# Patient Record
Sex: FEMALE | Race: WHITE | NOT HISPANIC OR LATINO | Employment: OTHER | URBAN - METROPOLITAN AREA
[De-identification: names, ages, dates, MRNs, and addresses within clinical notes are randomized per-mention and may not be internally consistent; named-entity substitution may affect disease eponyms.]

---

## 2017-02-07 ENCOUNTER — COMMUNICATION - HEALTHEAST (OUTPATIENT)
Dept: FAMILY MEDICINE | Facility: CLINIC | Age: 57
End: 2017-02-07

## 2017-02-07 DIAGNOSIS — I10 HYPERTENSION: ICD-10-CM

## 2017-02-09 ENCOUNTER — COMMUNICATION - HEALTHEAST (OUTPATIENT)
Dept: FAMILY MEDICINE | Facility: CLINIC | Age: 57
End: 2017-02-09

## 2017-02-09 DIAGNOSIS — I10 HYPERTENSION: ICD-10-CM

## 2017-02-13 ENCOUNTER — COMMUNICATION - HEALTHEAST (OUTPATIENT)
Dept: SCHEDULING | Facility: CLINIC | Age: 57
End: 2017-02-13

## 2017-06-20 ENCOUNTER — OFFICE VISIT - HEALTHEAST (OUTPATIENT)
Dept: FAMILY MEDICINE | Facility: CLINIC | Age: 57
End: 2017-06-20

## 2017-06-20 DIAGNOSIS — J01.90 ACUTE SINUSITIS: ICD-10-CM

## 2017-07-03 ENCOUNTER — AMBULATORY - HEALTHEAST (OUTPATIENT)
Dept: FAMILY MEDICINE | Facility: CLINIC | Age: 57
End: 2017-07-03

## 2017-07-03 ENCOUNTER — COMMUNICATION - HEALTHEAST (OUTPATIENT)
Dept: FAMILY MEDICINE | Facility: CLINIC | Age: 57
End: 2017-07-03

## 2017-07-03 DIAGNOSIS — K64.9 HEMORRHOIDS: ICD-10-CM

## 2017-07-05 ENCOUNTER — RECORDS - HEALTHEAST (OUTPATIENT)
Dept: ADMINISTRATIVE | Facility: OTHER | Age: 57
End: 2017-07-05

## 2017-07-23 ENCOUNTER — OFFICE VISIT - HEALTHEAST (OUTPATIENT)
Dept: FAMILY MEDICINE | Facility: CLINIC | Age: 57
End: 2017-07-23

## 2017-07-23 DIAGNOSIS — J18.9 CAP (COMMUNITY ACQUIRED PNEUMONIA): ICD-10-CM

## 2017-07-23 DIAGNOSIS — R06.2 WHEEZING ON EXPIRATION: ICD-10-CM

## 2017-08-29 ENCOUNTER — COMMUNICATION - HEALTHEAST (OUTPATIENT)
Dept: FAMILY MEDICINE | Facility: CLINIC | Age: 57
End: 2017-08-29

## 2017-08-29 DIAGNOSIS — I10 HYPERTENSION: ICD-10-CM

## 2017-09-07 ENCOUNTER — OFFICE VISIT - HEALTHEAST (OUTPATIENT)
Dept: FAMILY MEDICINE | Facility: CLINIC | Age: 57
End: 2017-09-07

## 2017-09-07 DIAGNOSIS — Z13.9 SCREENING: ICD-10-CM

## 2017-09-07 ASSESSMENT — MIFFLIN-ST. JEOR: SCORE: 1179.63

## 2017-09-11 ENCOUNTER — COMMUNICATION - HEALTHEAST (OUTPATIENT)
Dept: FAMILY MEDICINE | Facility: CLINIC | Age: 57
End: 2017-09-11

## 2017-09-20 ENCOUNTER — COMMUNICATION - HEALTHEAST (OUTPATIENT)
Dept: FAMILY MEDICINE | Facility: CLINIC | Age: 57
End: 2017-09-20

## 2017-10-12 ENCOUNTER — HOSPITAL ENCOUNTER (OUTPATIENT)
Dept: MAMMOGRAPHY | Facility: CLINIC | Age: 57
Discharge: HOME | End: 2017-10-12
Attending: FAMILY MEDICINE

## 2017-10-12 DIAGNOSIS — Z12.31 VISIT FOR SCREENING MAMMOGRAM: ICD-10-CM

## 2017-11-22 ENCOUNTER — COMMUNICATION - HEALTHEAST (OUTPATIENT)
Dept: FAMILY MEDICINE | Facility: CLINIC | Age: 57
End: 2017-11-22

## 2017-11-22 DIAGNOSIS — I10 HYPERTENSION: ICD-10-CM

## 2018-09-04 ENCOUNTER — RECORDS - HEALTHEAST (OUTPATIENT)
Dept: ADMINISTRATIVE | Facility: OTHER | Age: 58
End: 2018-09-04

## 2018-09-18 ENCOUNTER — COMMUNICATION - HEALTHEAST (OUTPATIENT)
Dept: FAMILY MEDICINE | Facility: CLINIC | Age: 58
End: 2018-09-18

## 2018-09-18 DIAGNOSIS — I10 HYPERTENSION: ICD-10-CM

## 2018-10-08 ENCOUNTER — OFFICE VISIT - HEALTHEAST (OUTPATIENT)
Dept: FAMILY MEDICINE | Facility: CLINIC | Age: 58
End: 2018-10-08

## 2018-10-08 DIAGNOSIS — L98.9 SKIN LESION: ICD-10-CM

## 2018-10-08 DIAGNOSIS — I10 HTN (HYPERTENSION): ICD-10-CM

## 2018-10-08 DIAGNOSIS — Z00.00 WELLNESS EXAMINATION: ICD-10-CM

## 2018-10-08 LAB
ANION GAP SERPL CALCULATED.3IONS-SCNC: 6 MMOL/L (ref 5–18)
BUN SERPL-MCNC: 11 MG/DL (ref 8–22)
CALCIUM SERPL-MCNC: 9.7 MG/DL (ref 8.5–10.5)
CHLORIDE BLD-SCNC: 106 MMOL/L (ref 98–107)
CHOLEST SERPL-MCNC: 207 MG/DL
CO2 SERPL-SCNC: 28 MMOL/L (ref 22–31)
CREAT SERPL-MCNC: 0.73 MG/DL (ref 0.6–1.1)
ERYTHROCYTE [DISTWIDTH] IN BLOOD BY AUTOMATED COUNT: 12.7 % (ref 11–14.5)
FASTING STATUS PATIENT QL REPORTED: YES
GFR SERPL CREATININE-BSD FRML MDRD: >60 ML/MIN/1.73M2
GLUCOSE BLD-MCNC: 91 MG/DL (ref 70–125)
HCT VFR BLD AUTO: 41.6 % (ref 35–47)
HDLC SERPL-MCNC: 65 MG/DL
HGB BLD-MCNC: 14 G/DL (ref 12–16)
LDLC SERPL CALC-MCNC: 128 MG/DL
MCH RBC QN AUTO: 31.2 PG (ref 27–34)
MCHC RBC AUTO-ENTMCNC: 33.7 G/DL (ref 32–36)
MCV RBC AUTO: 93 FL (ref 80–100)
PLATELET # BLD AUTO: 360 THOU/UL (ref 140–440)
PMV BLD AUTO: 7.9 FL (ref 7–10)
POTASSIUM BLD-SCNC: 4.8 MMOL/L (ref 3.5–5)
RBC # BLD AUTO: 4.49 MILL/UL (ref 3.8–5.4)
SODIUM SERPL-SCNC: 140 MMOL/L (ref 136–145)
TRIGL SERPL-MCNC: 71 MG/DL
WBC: 6.8 THOU/UL (ref 4–11)

## 2018-10-09 ENCOUNTER — COMMUNICATION - HEALTHEAST (OUTPATIENT)
Dept: SCHEDULING | Facility: CLINIC | Age: 58
End: 2018-10-09

## 2018-10-15 ENCOUNTER — RECORDS - HEALTHEAST (OUTPATIENT)
Dept: ADMINISTRATIVE | Facility: OTHER | Age: 58
End: 2018-10-15

## 2018-12-12 ENCOUNTER — OFFICE VISIT - HEALTHEAST (OUTPATIENT)
Dept: FAMILY MEDICINE | Facility: CLINIC | Age: 58
End: 2018-12-12

## 2018-12-12 DIAGNOSIS — F41.9 ANXIETY: ICD-10-CM

## 2018-12-12 DIAGNOSIS — J06.9 UPPER RESPIRATORY TRACT INFECTION, UNSPECIFIED TYPE: ICD-10-CM

## 2019-03-20 ENCOUNTER — OFFICE VISIT - HEALTHEAST (OUTPATIENT)
Dept: FAMILY MEDICINE | Facility: CLINIC | Age: 59
End: 2019-03-20

## 2019-03-20 DIAGNOSIS — J01.90 ACUTE NON-RECURRENT SINUSITIS, UNSPECIFIED LOCATION: ICD-10-CM

## 2019-03-20 DIAGNOSIS — G47.00 INSOMNIA, UNSPECIFIED TYPE: ICD-10-CM

## 2019-03-26 ENCOUNTER — COMMUNICATION - HEALTHEAST (OUTPATIENT)
Dept: FAMILY MEDICINE | Facility: CLINIC | Age: 59
End: 2019-03-26

## 2019-04-15 ENCOUNTER — OFFICE VISIT - HEALTHEAST (OUTPATIENT)
Dept: FAMILY MEDICINE | Facility: CLINIC | Age: 59
End: 2019-04-15

## 2019-04-15 DIAGNOSIS — M25.512 ACUTE PAIN OF BOTH SHOULDERS: ICD-10-CM

## 2019-04-15 DIAGNOSIS — M25.511 ACUTE PAIN OF BOTH SHOULDERS: ICD-10-CM

## 2019-04-29 ENCOUNTER — OFFICE VISIT - HEALTHEAST (OUTPATIENT)
Dept: FAMILY MEDICINE | Facility: CLINIC | Age: 59
End: 2019-04-29

## 2019-04-29 DIAGNOSIS — R51.9 HEADACHE: ICD-10-CM

## 2019-04-29 DIAGNOSIS — J02.9 PHARYNGITIS, UNSPECIFIED ETIOLOGY: ICD-10-CM

## 2019-04-29 DIAGNOSIS — J02.0 STREPTOCOCCAL SORE THROAT: ICD-10-CM

## 2019-04-29 LAB — DEPRECATED S PYO AG THROAT QL EIA: NORMAL

## 2019-04-29 ASSESSMENT — MIFFLIN-ST. JEOR: SCORE: 1125.2

## 2019-04-30 LAB — GROUP A STREP BY PCR: NORMAL

## 2019-09-25 ENCOUNTER — OFFICE VISIT - HEALTHEAST (OUTPATIENT)
Dept: FAMILY MEDICINE | Facility: CLINIC | Age: 59
End: 2019-09-25

## 2019-09-25 DIAGNOSIS — R35.0 FREQUENT URINATION: ICD-10-CM

## 2019-09-25 LAB
ALBUMIN UR-MCNC: NEGATIVE MG/DL
APPEARANCE UR: CLEAR
BILIRUB UR QL STRIP: NEGATIVE
COLOR UR AUTO: YELLOW
GLUCOSE UR STRIP-MCNC: NEGATIVE MG/DL
HGB UR QL STRIP: ABNORMAL
KETONES UR STRIP-MCNC: NEGATIVE MG/DL
LEUKOCYTE ESTERASE UR QL STRIP: ABNORMAL
NITRATE UR QL: NEGATIVE
PH UR STRIP: 7.5 [PH] (ref 5–8)
SP GR UR STRIP: 1.01 (ref 1–1.03)
UROBILINOGEN UR STRIP-ACNC: ABNORMAL

## 2019-09-27 LAB — BACTERIA SPEC CULT: ABNORMAL

## 2019-11-03 ENCOUNTER — COMMUNICATION - HEALTHEAST (OUTPATIENT)
Dept: FAMILY MEDICINE | Facility: CLINIC | Age: 59
End: 2019-11-03

## 2019-11-03 DIAGNOSIS — I10 HTN (HYPERTENSION): ICD-10-CM

## 2020-01-15 ENCOUNTER — COMMUNICATION - HEALTHEAST (OUTPATIENT)
Dept: FAMILY MEDICINE | Facility: CLINIC | Age: 60
End: 2020-01-15

## 2020-01-15 DIAGNOSIS — Z00.00 WELLNESS EXAMINATION: ICD-10-CM

## 2020-04-29 ENCOUNTER — COMMUNICATION - HEALTHEAST (OUTPATIENT)
Dept: FAMILY MEDICINE | Facility: CLINIC | Age: 60
End: 2020-04-29

## 2020-05-07 ENCOUNTER — AMBULATORY - HEALTHEAST (OUTPATIENT)
Dept: FAMILY MEDICINE | Facility: CLINIC | Age: 60
End: 2020-05-07

## 2020-05-07 DIAGNOSIS — F17.200 SMOKING: ICD-10-CM

## 2020-05-18 ENCOUNTER — COMMUNICATION - HEALTHEAST (OUTPATIENT)
Dept: FAMILY MEDICINE | Facility: CLINIC | Age: 60
End: 2020-05-18

## 2020-05-18 DIAGNOSIS — F17.200 SMOKING: ICD-10-CM

## 2020-06-11 ENCOUNTER — COMMUNICATION - HEALTHEAST (OUTPATIENT)
Dept: SCHEDULING | Facility: CLINIC | Age: 60
End: 2020-06-11

## 2020-07-23 ENCOUNTER — OFFICE VISIT - HEALTHEAST (OUTPATIENT)
Dept: FAMILY MEDICINE | Facility: CLINIC | Age: 60
End: 2020-07-23

## 2020-07-23 DIAGNOSIS — R31.9 BLOOD IN URINE: ICD-10-CM

## 2020-07-23 DIAGNOSIS — N30.01 ACUTE CYSTITIS WITH HEMATURIA: ICD-10-CM

## 2020-07-23 LAB
ALBUMIN UR-MCNC: ABNORMAL MG/DL
APPEARANCE UR: ABNORMAL
BACTERIA #/AREA URNS HPF: ABNORMAL HPF
BILIRUB UR QL STRIP: NEGATIVE
COLOR UR AUTO: YELLOW
GLUCOSE UR STRIP-MCNC: NEGATIVE MG/DL
HGB UR QL STRIP: ABNORMAL
KETONES UR STRIP-MCNC: NEGATIVE MG/DL
LEUKOCYTE ESTERASE UR QL STRIP: ABNORMAL
NITRATE UR QL: NEGATIVE
PH UR STRIP: 6 [PH] (ref 5–8)
RBC #/AREA URNS AUTO: ABNORMAL HPF
SP GR UR STRIP: 1.01 (ref 1–1.03)
SQUAMOUS #/AREA URNS AUTO: ABNORMAL LPF
TRANS CELLS #/AREA URNS HPF: ABNORMAL LPF
UROBILINOGEN UR STRIP-ACNC: ABNORMAL
WBC #/AREA URNS AUTO: ABNORMAL HPF
WBC CLUMPS #/AREA URNS HPF: PRESENT /[HPF]

## 2020-07-24 LAB — BACTERIA SPEC CULT: NO GROWTH

## 2020-09-01 ENCOUNTER — TELEPHONE (OUTPATIENT)
Dept: PLASTIC SURGERY | Facility: CLINIC | Age: 60
End: 2020-09-01

## 2020-09-01 ENCOUNTER — OFFICE VISIT - HEALTHEAST (OUTPATIENT)
Dept: FAMILY MEDICINE | Facility: CLINIC | Age: 60
End: 2020-09-01

## 2020-09-01 DIAGNOSIS — H02.539 EYELID RETRACTION, UNSPECIFIED LATERALITY: ICD-10-CM

## 2020-09-01 DIAGNOSIS — R00.0 RAPID OR IRREGULAR HEARTBEAT: ICD-10-CM

## 2020-09-01 DIAGNOSIS — Z00.00 WELLNESS EXAMINATION: ICD-10-CM

## 2020-09-01 DIAGNOSIS — F17.200 SMOKING: ICD-10-CM

## 2020-09-01 LAB
ERYTHROCYTE [DISTWIDTH] IN BLOOD BY AUTOMATED COUNT: 12.9 % (ref 11–14.5)
HCT VFR BLD AUTO: 43 % (ref 35–47)
HGB BLD-MCNC: 14.4 G/DL (ref 12–16)
MCH RBC QN AUTO: 30.8 PG (ref 27–34)
MCHC RBC AUTO-ENTMCNC: 33.4 G/DL (ref 32–36)
MCV RBC AUTO: 92 FL (ref 80–100)
PLATELET # BLD AUTO: 389 THOU/UL (ref 140–440)
PMV BLD AUTO: 7.2 FL (ref 7–10)
RBC # BLD AUTO: 4.68 MILL/UL (ref 3.8–5.4)
WBC: 10 THOU/UL (ref 4–11)

## 2020-09-01 ASSESSMENT — MIFFLIN-ST. JEOR: SCORE: 1072.47

## 2020-09-01 NOTE — TELEPHONE ENCOUNTER
9/1/2020, 4:14 PM    Spoke with patient. Rescheduled appointment for 9/23/20 at 0815h. The patient voiced agreement and understanding of date, time, and place of appointment.      JAIDEN SteinT

## 2020-09-02 ENCOUNTER — COMMUNICATION - HEALTHEAST (OUTPATIENT)
Dept: FAMILY MEDICINE | Facility: CLINIC | Age: 60
End: 2020-09-02

## 2020-09-02 LAB
ANION GAP SERPL CALCULATED.3IONS-SCNC: 10 MMOL/L (ref 5–18)
BUN SERPL-MCNC: 8 MG/DL (ref 8–22)
CALCIUM SERPL-MCNC: 9.3 MG/DL (ref 8.5–10.5)
CHLORIDE BLD-SCNC: 103 MMOL/L (ref 98–107)
CHOLEST SERPL-MCNC: 226 MG/DL
CO2 SERPL-SCNC: 27 MMOL/L (ref 22–31)
CREAT SERPL-MCNC: 0.77 MG/DL (ref 0.6–1.1)
FASTING STATUS PATIENT QL REPORTED: YES
GFR SERPL CREATININE-BSD FRML MDRD: >60 ML/MIN/1.73M2
GLUCOSE BLD-MCNC: 81 MG/DL (ref 70–125)
HDLC SERPL-MCNC: 54 MG/DL
LDLC SERPL CALC-MCNC: 132 MG/DL
POTASSIUM BLD-SCNC: 4.4 MMOL/L (ref 3.5–5)
SODIUM SERPL-SCNC: 140 MMOL/L (ref 136–145)
TRIGL SERPL-MCNC: 202 MG/DL

## 2020-09-02 NOTE — TELEPHONE ENCOUNTER
FUTURE VISIT INFORMATION      FUTURE VISIT INFORMATION:    Date: 9/23/20    Time: 8/15/20    [place] [facility] [place]  REFERRAL INFORMATION:    Referring provider:  Dr. Davin Nolen     Referring providers clinic:  [facility]    Reason for visit/diagnosis  eyelid retraction     RECORDS REQUESTED FROM:       Clinic name Comments Records Status Imaging Status   [facility] OV/referral 9/1/20 EPIC

## 2020-09-04 LAB
ATRIAL RATE - MUSE: 63 BPM
DIASTOLIC BLOOD PRESSURE - MUSE: NORMAL
INTERPRETATION ECG - MUSE: NORMAL
P AXIS - MUSE: 52 DEGREES
PR INTERVAL - MUSE: 168 MS
QRS DURATION - MUSE: 78 MS
QT - MUSE: 394 MS
QTC - MUSE: 403 MS
R AXIS - MUSE: 19 DEGREES
SYSTOLIC BLOOD PRESSURE - MUSE: NORMAL
T AXIS - MUSE: 39 DEGREES
VENTRICULAR RATE- MUSE: 63 BPM

## 2020-09-16 PROBLEM — E78.00 PRIMARY HYPERCHOLESTEROLEMIA: Status: ACTIVE | Noted: 2020-09-16

## 2020-09-16 PROBLEM — R51.9 HEADACHE: Status: ACTIVE | Noted: 2020-09-16

## 2020-09-16 PROBLEM — F17.200 NICOTINE DEPENDENCE: Status: ACTIVE | Noted: 2020-09-16

## 2020-09-16 PROBLEM — M79.609 PAIN IN LIMB: Status: ACTIVE | Noted: 2020-09-16

## 2020-09-16 PROBLEM — M12.9 ARTHROPATHY OF MULTIPLE SITES: Status: ACTIVE | Noted: 2020-09-16

## 2020-09-16 PROBLEM — R53.81 OTHER MALAISE AND FATIGUE: Status: ACTIVE | Noted: 2020-09-16

## 2020-09-16 PROBLEM — D49.2 NEOPLASM OF UNSPECIFIED NATURE OF BONE, SOFT TISSUE, AND SKIN: Status: ACTIVE | Noted: 2020-09-16

## 2020-09-16 PROBLEM — G47.00 INSOMNIA: Status: ACTIVE | Noted: 2020-09-16

## 2020-09-16 PROBLEM — I10 ESSENTIAL HYPERTENSION: Status: ACTIVE | Noted: 2020-09-16

## 2020-09-16 PROBLEM — R53.83 OTHER MALAISE AND FATIGUE: Status: ACTIVE | Noted: 2020-09-16

## 2020-09-16 PROBLEM — J02.9 ACUTE PHARYNGITIS: Status: ACTIVE | Noted: 2020-09-16

## 2020-09-23 ENCOUNTER — PRE VISIT (OUTPATIENT)
Dept: SURGERY | Facility: CLINIC | Age: 60
End: 2020-09-23

## 2020-09-23 ENCOUNTER — TRANSCRIBE ORDERS (OUTPATIENT)
Dept: PLASTIC SURGERY | Facility: CLINIC | Age: 60
End: 2020-09-23

## 2020-09-23 ENCOUNTER — OFFICE VISIT (OUTPATIENT)
Dept: PLASTIC SURGERY | Facility: CLINIC | Age: 60
End: 2020-09-23
Payer: COMMERCIAL

## 2020-09-23 VITALS
WEIGHT: 122.6 LBS | OXYGEN SATURATION: 100 % | SYSTOLIC BLOOD PRESSURE: 139 MMHG | DIASTOLIC BLOOD PRESSURE: 86 MMHG | BODY MASS INDEX: 23.15 KG/M2 | HEIGHT: 61 IN | HEART RATE: 62 BPM

## 2020-09-23 DIAGNOSIS — H02.832 DERMATOCHALASIS OF UPPER AND LOWER EYELIDS OF BOTH EYES: Primary | ICD-10-CM

## 2020-09-23 DIAGNOSIS — H02.831 DERMATOCHALASIS OF UPPER AND LOWER EYELIDS OF BOTH EYES: Primary | ICD-10-CM

## 2020-09-23 DIAGNOSIS — H02.835 DERMATOCHALASIS OF UPPER AND LOWER EYELIDS OF BOTH EYES: Primary | ICD-10-CM

## 2020-09-23 DIAGNOSIS — H02.834 DERMATOCHALASIS OF UPPER AND LOWER EYELIDS OF BOTH EYES: Primary | ICD-10-CM

## 2020-09-23 SDOH — HEALTH STABILITY: MENTAL HEALTH: HOW OFTEN DO YOU HAVE A DRINK CONTAINING ALCOHOL?: 2-3 TIMES A WEEK

## 2020-09-23 ASSESSMENT — MIFFLIN-ST. JEOR: SCORE: 1078.49

## 2020-09-23 ASSESSMENT — PAIN SCALES - GENERAL: PAINLEVEL: NO PAIN (0)

## 2020-09-23 NOTE — LETTER
Date:September 28, 2020      Patient was self referred, no letter generated. Do not send.        [facility] Health Information

## 2020-09-23 NOTE — LETTER
9/23/2020       RE: Nohelia Mcdaniels  [address] [id number]     Dear Colleague,    Thank you for referring your patient, Nohelia Mcdaniels, to the [facility] P[facility] SURGERY at [facility]. Please see a copy of my visit note below.    NEW PATIENT VISIT NOTE       PRESENTING COMPLAINT:  Peripheral vision obstruction and facial aging.      HISTORY OF PRESENTING COMPLAINT:  Ms. Mcdaniels is 59 years old.  For a few years now, she has noticed that she is having difficulty with her peripheral vision.  She feels her upper eyelids are very heavy and droopy.  She also has to raise her brows to have better vision.  Additionally, she is concerned about her jowling and excess skin in her neck and here to discuss these things with me.  She has no dry eye symptoms.  She has had LASIK surgery in the past about 4 years ago.  She has no thyroid issues.  She does wear glasses.  Last time she had her eyes checked was 3-4 years ago.      PAST MEDICAL HISTORY:  Nil.      PAST SURGICAL HISTORY:  LASIK, T&A and a right thigh mass excision.      MEDICATIONS:  Nil.      ALLERGIES:  Nil.      SOCIAL HISTORY:  Used to smoke about 1/2 pack a day for 30 years, quit 2-1/2 months ago, is on nicotine lozenges.  Drinks socially.  Lives in [place], is retired.      REVIEW OF SYSTEMS:  Denies chest pain, shortness of breath, MI, CVA, DVT and PE.      PHYSICAL EXAMINATION:  Vital signs are stable.  She is afebrile, in no obvious distress.  She is 5 feet 1 inch, 122 pounds, BMI 23 kg/m2.  On examination of her periorbital area, she has about a 6 cm forehead.  She has a normal head of hair.  She has a very animated forehead with lots of rhytids.  Her brows are minimally ptotic, but are dynamic.  She has upper lid dermatochalasis with excess skin with little excess herniating fat pads.  She has no obvious ptosis.  She has normal levator function.  Lower lids have also dermatochalasis with  excess skin and some bulging fat pads, and the cheek junction has a demarcation with nasojugal grooving.  She has no lid retraction.  She has no lagophthalmos.  She has a bell that is intact, and her vector is neutral to positive.  She has Barahona type II skin.  She has age spots.  She has fine wrinkles, especially periorally and deeper nasolabial creases.  She has jowling, and the angle of her jaw is not very sharp, and she has excess skin with platysmal banding in the neck.      ASSESSMENT AND PLAN:  Based upon the above findings, a diagnosis of upper lid dermatochalasis, mild brow ptosis, animated forehead along with facial aging was made.  I had a louisa, detailed discussion with the patient about her findings.  Based on the issues that she has described, I think she would benefit the most from a very aggressive skin regimen, including laser resurfacing, and then appropriate daily skin management to help the aged skin and to help with the fine wrinkles.  She would benefit from an upper lid blepharoplasty with the issues that she described along with Botox of her forehead and Botox around the brow to give her a little lift.  She would also be a good candidate for an SMAS plication facelift.  I explained to her that the facelift would help the lower face and the neck and does not impact the fine wrinkles around the mouth or the nasolabial creases.  She would also benefit from some fat grafting to the lips and nasolabial creases if she so desired.  All this was discussed with her in detail.  The upper lid dermatochalasis and repair because of peripheral vision obstruction could be covered by insurance if she gets peripheral vision tests that show that they are affected.  I have advised her to get ophthalmologic tests and get the peripheral vision test done.  The rest would be private pay.  We will give her quotes for these procedures.  She will think about it and let me know if she wants to proceed.  Once I know  she wants to proceed with any of these procedures, we will see her back to go over them in more detail.  All questions were answered.  She was happy with the visit.      Total time spent with the patient was 30 minutes, of which more than half was counseling.         Again, thank you for allowing me to participate in the care of your patient.      Sincerely,    AMINA Hough MD

## 2020-09-23 NOTE — PROGRESS NOTES
NEW PATIENT VISIT NOTE       PRESENTING COMPLAINT:  Peripheral vision obstruction and facial aging.      HISTORY OF PRESENTING COMPLAINT:  Ms. Mcdaniels is 59 years old.  For a few years now, she has noticed that she is having difficulty with her peripheral vision.  She feels her upper eyelids are very heavy and droopy.  She also has to raise her brows to have better vision.  Additionally, she is concerned about her jowling and excess skin in her neck and here to discuss these things with me.  She has no dry eye symptoms.  She has had LASIK surgery in the past about 4 years ago.  She has no thyroid issues.  She does wear glasses.  Last time she had her eyes checked was 3-4 years ago.      PAST MEDICAL HISTORY:  Nil.      PAST SURGICAL HISTORY:  LASIK, T&A and a right thigh mass excision.      MEDICATIONS:  Nil.      ALLERGIES:  Nil.      SOCIAL HISTORY:  Used to smoke about 1/2 pack a day for 30 years, quit 2-1/2 months ago, is on nicotine lozenges.  Drinks socially.  Lives in [place], is retired.      REVIEW OF SYSTEMS:  Denies chest pain, shortness of breath, MI, CVA, DVT and PE.      PHYSICAL EXAMINATION:  Vital signs are stable.  She is afebrile, in no obvious distress.  She is 5 feet 1 inch, 122 pounds, BMI 23 kg/m2.  On examination of her periorbital area, she has about a 6 cm forehead.  She has a normal head of hair.  She has a very animated forehead with lots of rhytids.  Her brows are minimally ptotic, but are dynamic.  She has upper lid dermatochalasis with excess skin with little excess herniating fat pads.  She has no obvious ptosis.  She has normal levator function.  Lower lids have also dermatochalasis with excess skin and some bulging fat pads, and the cheek junction has a demarcation with nasojugal grooving.  She has no lid retraction.  She has no lagophthalmos.  She has a bell that is intact, and her vector is neutral to positive.  She has Barahona type II skin.  She has age spots.  She has fine  wrinkles, especially periorally and deeper nasolabial creases.  She has jowling, and the angle of her jaw is not very sharp, and she has excess skin with platysmal banding in the neck.      ASSESSMENT AND PLAN:  Based upon the above findings, a diagnosis of upper lid dermatochalasis, mild brow ptosis, animated forehead along with facial aging was made.  I had a louisa, detailed discussion with the patient about her findings.  Based on the issues that she has described, I think she would benefit the most from a very aggressive skin regimen, including laser resurfacing, and then appropriate daily skin management to help the aged skin and to help with the fine wrinkles.  She would benefit from an upper lid blepharoplasty with the issues that she described along with Botox of her forehead and Botox around the brow to give her a little lift.  She would also be a good candidate for an SMAS plication facelift.  I explained to her that the facelift would help the lower face and the neck and does not impact the fine wrinkles around the mouth or the nasolabial creases.  She would also benefit from some fat grafting to the lips and nasolabial creases if she so desired.  All this was discussed with her in detail.  The upper lid dermatochalasis and repair because of peripheral vision obstruction could be covered by insurance if she gets peripheral vision tests that show that they are affected.  I have advised her to get ophthalmologic tests and get the peripheral vision test done.  The rest would be private pay.  We will give her quotes for these procedures.  She will think about it and let me know if she wants to proceed.  Once I know she wants to proceed with any of these procedures, we will see her back to go over them in more detail.  All questions were answered.  She was happy with the visit.      Total time spent with the patient was 30 minutes, of which more than half was counseling.

## 2020-09-23 NOTE — NURSING NOTE
"Chief Complaint   Patient presents with     Consult     New patient consult for her eylid.       Vitals:    09/23/20 0817   BP: 139/86   Pulse: 62   SpO2: 100%   Weight: 55.6 kg (122 lb 9.6 oz)   Height: 1.549 m (5' 1\")       Body mass index is 23.17 kg/m .    Pam Valera LPN                     "

## 2020-09-24 ENCOUNTER — TELEPHONE (OUTPATIENT)
Dept: SURGERY | Facility: CLINIC | Age: 60
End: 2020-09-24

## 2020-09-24 ENCOUNTER — MYC MEDICAL ADVICE (OUTPATIENT)
Dept: DERMATOLOGY | Facility: CLINIC | Age: 60
End: 2020-09-24

## 2020-09-30 ENCOUNTER — ALLIED HEALTH/NURSE VISIT (OUTPATIENT)
Dept: OPHTHALMOLOGY | Facility: CLINIC | Age: 60
End: 2020-09-30
Attending: PLASTIC SURGERY
Payer: COMMERCIAL

## 2020-09-30 DIAGNOSIS — H02.835 DERMATOCHALASIS OF UPPER AND LOWER EYELIDS OF BOTH EYES: ICD-10-CM

## 2020-09-30 DIAGNOSIS — H02.832 DERMATOCHALASIS OF UPPER AND LOWER EYELIDS OF BOTH EYES: ICD-10-CM

## 2020-09-30 DIAGNOSIS — H02.831 DERMATOCHALASIS OF UPPER AND LOWER EYELIDS OF BOTH EYES: ICD-10-CM

## 2020-09-30 DIAGNOSIS — H02.834 DERMATOCHALASIS OF UPPER AND LOWER EYELIDS OF BOTH EYES: ICD-10-CM

## 2020-10-05 ENCOUNTER — HOSPITAL ENCOUNTER (OUTPATIENT)
Dept: MAMMOGRAPHY | Facility: CLINIC | Age: 60
Discharge: HOME | End: 2020-10-05
Attending: FAMILY MEDICINE

## 2020-10-15 NOTE — TELEPHONE ENCOUNTER
LVM for patient that message has been sent to our financial team and they will start the PA process. Patient can reach back out in 1-2 weeks if she has not heard from us.    Pam Valera LPN

## 2020-10-21 ENCOUNTER — TELEPHONE (OUTPATIENT)
Dept: SURGERY | Facility: CLINIC | Age: 60
End: 2020-10-21

## 2020-10-28 NOTE — TELEPHONE ENCOUNTER
Spoke with pt and explained that we are still in process of making PA request. Pt states understanding and denies any additional questions or concerns. Kenzie DAVIS RNCC

## 2020-11-03 DIAGNOSIS — H02.834 DERMATOCHALASIS OF BOTH UPPER EYELIDS: Primary | ICD-10-CM

## 2020-11-03 DIAGNOSIS — H02.831 DERMATOCHALASIS OF BOTH UPPER EYELIDS: Primary | ICD-10-CM

## 2020-11-03 RX ORDER — CEFAZOLIN SODIUM 1 G/50ML
1 INJECTION, SOLUTION INTRAVENOUS SEE ADMIN INSTRUCTIONS
Status: CANCELLED | OUTPATIENT
Start: 2020-11-03

## 2020-11-03 RX ORDER — CEFAZOLIN SODIUM 2 G/50ML
2 SOLUTION INTRAVENOUS
Status: CANCELLED | OUTPATIENT
Start: 2020-11-03

## 2020-11-04 ENCOUNTER — DOCUMENTATION ONLY (OUTPATIENT)
Dept: SURGERY | Facility: CLINIC | Age: 60
End: 2020-11-04

## 2020-11-04 ENCOUNTER — HOSPITAL ENCOUNTER (OUTPATIENT)
Facility: AMBULATORY SURGERY CENTER | Age: 60
End: 2020-11-04
Attending: PLASTIC SURGERY
Payer: COMMERCIAL

## 2020-11-04 NOTE — PROGRESS NOTES
I contacted the patient via [employer] and scheduled dates and provide the following information:     Surgery is scheduled with Dr. Hough on 1/14/21 at [facility].  Scheduled per surgeon.    Pre-op consult with Dr. Hough on 1/5/21.     COVID-19 test scheduled for 1/11/21    H&P: to be completed by PCP.  POST-OP: 1/26/21    They are aware that they will receive a call  ~2 days prior to the scheduled procedure and will be given an exact arrival/start time.    The surgery packet was provided via [employer].

## 2020-11-14 DIAGNOSIS — Z11.59 ENCOUNTER FOR SCREENING FOR OTHER VIRAL DISEASES: Primary | ICD-10-CM

## 2020-11-17 DIAGNOSIS — R23.8 FACIAL AGING: Primary | ICD-10-CM

## 2020-11-17 RX ORDER — CEFAZOLIN SODIUM 1 G/50ML
1 INJECTION, SOLUTION INTRAVENOUS SEE ADMIN INSTRUCTIONS
Status: CANCELLED | OUTPATIENT
Start: 2020-11-17

## 2020-11-17 RX ORDER — CEFAZOLIN SODIUM 2 G/50ML
2 SOLUTION INTRAVENOUS
Status: CANCELLED | OUTPATIENT
Start: 2020-11-17

## 2020-11-18 ENCOUNTER — COMMUNICATION - HEALTHEAST (OUTPATIENT)
Dept: FAMILY MEDICINE | Facility: CLINIC | Age: 60
End: 2020-11-18

## 2020-11-18 DIAGNOSIS — I10 HTN (HYPERTENSION): ICD-10-CM

## 2020-11-19 ENCOUNTER — DOCUMENTATION ONLY (OUTPATIENT)
Dept: SURGERY | Facility: CLINIC | Age: 60
End: 2020-11-19

## 2020-11-19 PROBLEM — R23.8 FACIAL AGING: Status: ACTIVE | Noted: 2020-11-19

## 2020-11-19 NOTE — PROGRESS NOTES
Surgery is scheduled with Dr. Hough on 4/22/21 at the [facility] ([facility] .  Scheduled per patient.    H&P: to be completed by PCP.    Additional appointments:   Consult with Dr. Hough  on 4/6 at 11:30 AM     COVID-19 test: 4/19 at [place]   Post-op: 5/4 as a in person visit.    The RN completed the education regarding the surgery.     Patient will receive a phone call from pre-admission nurses 1-2 days prior to surgery with arrival and start time.    The surgery packet was sent via [employer].    Patient will complete COVID-19 test that was scheduled by surgical coordinator 2-4 days prior to surgery.     I sent a Yee Carehart to confirm the information above, will call if the patient requests.

## 2020-11-29 DIAGNOSIS — Z11.59 ENCOUNTER FOR SCREENING FOR OTHER VIRAL DISEASES: Primary | ICD-10-CM

## 2020-12-16 ENCOUNTER — COMMUNICATION - HEALTHEAST (OUTPATIENT)
Dept: SCHEDULING | Facility: CLINIC | Age: 60
End: 2020-12-16

## 2020-12-16 ENCOUNTER — OFFICE VISIT - HEALTHEAST (OUTPATIENT)
Dept: FAMILY MEDICINE | Facility: CLINIC | Age: 60
End: 2020-12-16

## 2020-12-16 ENCOUNTER — AMBULATORY - HEALTHEAST (OUTPATIENT)
Dept: FAMILY MEDICINE | Facility: CLINIC | Age: 60
End: 2020-12-16

## 2020-12-16 DIAGNOSIS — R05.9 COUGH: ICD-10-CM

## 2020-12-18 ENCOUNTER — COMMUNICATION - HEALTHEAST (OUTPATIENT)
Dept: SCHEDULING | Facility: CLINIC | Age: 60
End: 2020-12-18

## 2021-01-04 ENCOUNTER — HEALTH MAINTENANCE LETTER (OUTPATIENT)
Age: 61
End: 2021-01-04

## 2021-01-26 ENCOUNTER — OFFICE VISIT - HEALTHEAST (OUTPATIENT)
Dept: FAMILY MEDICINE | Facility: CLINIC | Age: 61
End: 2021-01-26

## 2021-01-26 DIAGNOSIS — I10 HTN (HYPERTENSION): ICD-10-CM

## 2021-01-26 DIAGNOSIS — M54.50 ACUTE LEFT-SIDED LOW BACK PAIN WITHOUT SCIATICA: ICD-10-CM

## 2021-01-26 DIAGNOSIS — L30.9 ECZEMA, UNSPECIFIED TYPE: ICD-10-CM

## 2021-01-26 DIAGNOSIS — R51.9 NONINTRACTABLE HEADACHE, UNSPECIFIED CHRONICITY PATTERN, UNSPECIFIED HEADACHE TYPE: ICD-10-CM

## 2021-01-26 RX ORDER — CHLORAL HYDRATE 500 MG
2 CAPSULE ORAL DAILY
Status: SHIPPED | COMMUNITY
Start: 2021-01-26

## 2021-01-26 RX ORDER — DESONIDE 0.5 MG/G
CREAM TOPICAL 2 TIMES DAILY
Qty: 15 G | Refills: 0 | Status: SHIPPED | OUTPATIENT
Start: 2021-01-26 | End: 2022-11-22

## 2021-01-26 RX ORDER — METOPROLOL SUCCINATE 50 MG/1
50 TABLET, EXTENDED RELEASE ORAL DAILY
Qty: 90 TABLET | Refills: 0 | Status: SHIPPED | OUTPATIENT
Start: 2021-01-26 | End: 2022-01-26

## 2021-01-26 RX ORDER — MULTIPLE VITAMINS W/ MINERALS TAB 9MG-400MCG
1 TAB ORAL DAILY
Status: SHIPPED | COMMUNITY
Start: 2021-01-26

## 2021-01-26 ASSESSMENT — MIFFLIN-ST. JEOR: SCORE: 1075.3

## 2021-02-01 ENCOUNTER — OFFICE VISIT - HEALTHEAST (OUTPATIENT)
Dept: PHYSICAL THERAPY | Facility: REHABILITATION | Age: 61
End: 2021-02-01

## 2021-02-01 DIAGNOSIS — M79.18 MYOFASCIAL PAIN: ICD-10-CM

## 2021-02-01 DIAGNOSIS — M54.50 ACUTE BILATERAL LOW BACK PAIN WITHOUT SCIATICA: ICD-10-CM

## 2021-02-09 ENCOUNTER — OFFICE VISIT - HEALTHEAST (OUTPATIENT)
Dept: PHYSICAL THERAPY | Facility: REHABILITATION | Age: 61
End: 2021-02-09

## 2021-02-09 DIAGNOSIS — M54.50 ACUTE BILATERAL LOW BACK PAIN WITHOUT SCIATICA: ICD-10-CM

## 2021-02-09 DIAGNOSIS — M79.18 MYOFASCIAL PAIN: ICD-10-CM

## 2021-02-19 ENCOUNTER — OFFICE VISIT - HEALTHEAST (OUTPATIENT)
Dept: PHYSICAL THERAPY | Facility: REHABILITATION | Age: 61
End: 2021-02-19

## 2021-02-19 DIAGNOSIS — M54.50 ACUTE BILATERAL LOW BACK PAIN WITHOUT SCIATICA: ICD-10-CM

## 2021-02-19 DIAGNOSIS — M79.18 MYOFASCIAL PAIN: ICD-10-CM

## 2021-02-22 ENCOUNTER — OFFICE VISIT - HEALTHEAST (OUTPATIENT)
Dept: PHYSICAL THERAPY | Facility: REHABILITATION | Age: 61
End: 2021-02-22

## 2021-02-22 DIAGNOSIS — M79.18 MYOFASCIAL PAIN: ICD-10-CM

## 2021-02-22 DIAGNOSIS — M54.50 ACUTE BILATERAL LOW BACK PAIN WITHOUT SCIATICA: ICD-10-CM

## 2021-03-01 ENCOUNTER — OFFICE VISIT - HEALTHEAST (OUTPATIENT)
Dept: PHYSICAL THERAPY | Facility: REHABILITATION | Age: 61
End: 2021-03-01

## 2021-03-01 DIAGNOSIS — M79.18 MYOFASCIAL PAIN: ICD-10-CM

## 2021-03-01 DIAGNOSIS — M54.50 ACUTE BILATERAL LOW BACK PAIN WITHOUT SCIATICA: ICD-10-CM

## 2021-03-15 ENCOUNTER — OFFICE VISIT - HEALTHEAST (OUTPATIENT)
Dept: FAMILY MEDICINE | Facility: CLINIC | Age: 61
End: 2021-03-15

## 2021-03-15 DIAGNOSIS — M54.9 BACK PAIN, UNSPECIFIED BACK LOCATION, UNSPECIFIED BACK PAIN LATERALITY, UNSPECIFIED CHRONICITY: ICD-10-CM

## 2021-03-15 ASSESSMENT — MIFFLIN-ST. JEOR: SCORE: 1084.38

## 2021-03-26 ENCOUNTER — HOSPITAL ENCOUNTER (OUTPATIENT)
Dept: CT IMAGING | Facility: CLINIC | Age: 61
Discharge: HOME | End: 2021-03-26
Attending: FAMILY MEDICINE

## 2021-03-28 ENCOUNTER — RECORDS - HEALTHEAST (OUTPATIENT)
Dept: ADMINISTRATIVE | Facility: OTHER | Age: 61
End: 2021-03-28

## 2021-03-29 ENCOUNTER — COMMUNICATION - HEALTHEAST (OUTPATIENT)
Dept: FAMILY MEDICINE | Facility: CLINIC | Age: 61
End: 2021-03-29

## 2021-03-29 ENCOUNTER — AMBULATORY - HEALTHEAST (OUTPATIENT)
Dept: FAMILY MEDICINE | Facility: CLINIC | Age: 61
End: 2021-03-29

## 2021-03-29 DIAGNOSIS — R19.00 PELVIC MASS: ICD-10-CM

## 2021-03-31 DIAGNOSIS — H02.832 DERMATOCHALASIS OF UPPER AND LOWER EYELIDS OF BOTH EYES: Primary | ICD-10-CM

## 2021-03-31 DIAGNOSIS — H02.835 DERMATOCHALASIS OF UPPER AND LOWER EYELIDS OF BOTH EYES: Primary | ICD-10-CM

## 2021-03-31 DIAGNOSIS — H02.831 DERMATOCHALASIS OF UPPER AND LOWER EYELIDS OF BOTH EYES: Primary | ICD-10-CM

## 2021-03-31 DIAGNOSIS — H02.834 DERMATOCHALASIS OF UPPER AND LOWER EYELIDS OF BOTH EYES: Primary | ICD-10-CM

## 2021-04-01 ENCOUNTER — AMBULATORY - HEALTHEAST (OUTPATIENT)
Dept: FAMILY MEDICINE | Facility: CLINIC | Age: 61
End: 2021-04-01

## 2021-04-01 DIAGNOSIS — R19.00 PELVIC MASS: ICD-10-CM

## 2021-04-02 ENCOUNTER — HOSPITAL ENCOUNTER (OUTPATIENT)
Dept: ULTRASOUND IMAGING | Facility: CLINIC | Age: 61
Discharge: HOME | End: 2021-04-02
Attending: FAMILY MEDICINE

## 2021-04-02 ENCOUNTER — RECORDS - HEALTHEAST (OUTPATIENT)
Dept: ADMINISTRATIVE | Facility: OTHER | Age: 61
End: 2021-04-02

## 2021-04-02 DIAGNOSIS — R19.00 PELVIC MASS: ICD-10-CM

## 2021-04-05 ENCOUNTER — AMBULATORY - HEALTHEAST (OUTPATIENT)
Dept: FAMILY MEDICINE | Facility: CLINIC | Age: 61
End: 2021-04-05

## 2021-04-05 ENCOUNTER — PATIENT OUTREACH (OUTPATIENT)
Dept: PLASTIC SURGERY | Facility: CLINIC | Age: 61
End: 2021-04-05

## 2021-04-05 DIAGNOSIS — N83.8 OVARIAN MASS: ICD-10-CM

## 2021-04-05 NOTE — PATIENT INSTRUCTIONS
Left message for pt regarding rescheduling cancelled preop visit. Provided direct contact information and requested call back to reschedule. Kenzie DAVIS RN, BSN

## 2021-04-06 ENCOUNTER — RECORDS - HEALTHEAST (OUTPATIENT)
Dept: ADMINISTRATIVE | Facility: OTHER | Age: 61
End: 2021-04-06

## 2021-04-06 ENCOUNTER — AMBULATORY - HEALTHEAST (OUTPATIENT)
Dept: SURGERY | Facility: AMBULATORY SURGERY CENTER | Age: 61
End: 2021-04-06

## 2021-04-06 DIAGNOSIS — Z11.59 ENCOUNTER FOR SCREENING FOR OTHER VIRAL DISEASES: ICD-10-CM

## 2021-04-07 ENCOUNTER — OFFICE VISIT - HEALTHEAST (OUTPATIENT)
Dept: FAMILY MEDICINE | Facility: CLINIC | Age: 61
End: 2021-04-07

## 2021-04-07 DIAGNOSIS — Z01.818 PRE-OP EXAM: ICD-10-CM

## 2021-04-07 DIAGNOSIS — I10 ESSENTIAL HYPERTENSION: ICD-10-CM

## 2021-04-07 LAB
ERYTHROCYTE [DISTWIDTH] IN BLOOD BY AUTOMATED COUNT: 14.2 % (ref 11–14.5)
HCT VFR BLD AUTO: 43.4 % (ref 35–47)
HGB BLD-MCNC: 14.2 G/DL (ref 12–16)
MCH RBC QN AUTO: 30.8 PG (ref 27–34)
MCHC RBC AUTO-ENTMCNC: 32.7 G/DL (ref 32–36)
MCV RBC AUTO: 94 FL (ref 80–100)
PLATELET # BLD AUTO: 326 THOU/UL (ref 140–440)
PMV BLD AUTO: 9.1 FL (ref 7–10)
RBC # BLD AUTO: 4.61 MILL/UL (ref 3.8–5.4)
WBC: 7.7 THOU/UL (ref 4–11)

## 2021-04-07 ASSESSMENT — MIFFLIN-ST. JEOR: SCORE: 1118.39

## 2021-04-08 LAB
ANION GAP SERPL CALCULATED.3IONS-SCNC: 10 MMOL/L (ref 5–18)
BUN SERPL-MCNC: 15 MG/DL (ref 8–22)
CALCIUM SERPL-MCNC: 9 MG/DL (ref 8.5–10.5)
CHLORIDE BLD-SCNC: 105 MMOL/L (ref 98–107)
CO2 SERPL-SCNC: 27 MMOL/L (ref 22–31)
CREAT SERPL-MCNC: 0.78 MG/DL (ref 0.6–1.1)
GFR SERPL CREATININE-BSD FRML MDRD: >60 ML/MIN/1.73M2
GLUCOSE BLD-MCNC: 83 MG/DL (ref 70–125)
POTASSIUM BLD-SCNC: 4.7 MMOL/L (ref 3.5–5)
SODIUM SERPL-SCNC: 142 MMOL/L (ref 136–145)

## 2021-04-08 ASSESSMENT — MIFFLIN-ST. JEOR
SCORE: 1093.45
SCORE: 1093.45

## 2021-04-09 ENCOUNTER — ANESTHESIA - HEALTHEAST (OUTPATIENT)
Dept: SURGERY | Facility: AMBULATORY SURGERY CENTER | Age: 61
End: 2021-04-09

## 2021-04-10 ENCOUNTER — AMBULATORY - HEALTHEAST (OUTPATIENT)
Dept: FAMILY MEDICINE | Facility: CLINIC | Age: 61
End: 2021-04-10

## 2021-04-10 ENCOUNTER — COMMUNICATION - HEALTHEAST (OUTPATIENT)
Dept: FAMILY MEDICINE | Facility: CLINIC | Age: 61
End: 2021-04-10

## 2021-04-10 DIAGNOSIS — Z20.822 ENCOUNTER FOR LABORATORY TESTING FOR COVID-19 VIRUS: ICD-10-CM

## 2021-04-10 LAB
SARS-COV-2 PCR COMMENT: NORMAL
SARS-COV-2 RNA SPEC QL NAA+PROBE: NEGATIVE
SARS-COV-2 VIRUS SPECIMEN SOURCE: NORMAL

## 2021-04-11 ENCOUNTER — COMMUNICATION - HEALTHEAST (OUTPATIENT)
Dept: SCHEDULING | Facility: CLINIC | Age: 61
End: 2021-04-11

## 2021-04-12 ENCOUNTER — SURGERY - HEALTHEAST (OUTPATIENT)
Dept: SURGERY | Facility: AMBULATORY SURGERY CENTER | Age: 61
End: 2021-04-12

## 2021-04-12 ENCOUNTER — HOSPITAL ENCOUNTER (OUTPATIENT)
Dept: SURGERY | Facility: AMBULATORY SURGERY CENTER | Age: 61
Discharge: HOME | End: 2021-04-12
Attending: OBSTETRICS & GYNECOLOGY | Admitting: OBSTETRICS & GYNECOLOGY
Payer: COMMERCIAL

## 2021-04-12 DIAGNOSIS — N83.201 CYST OF RIGHT OVARY: ICD-10-CM

## 2021-04-12 RX ORDER — IBUPROFEN 600 MG/1
600 TABLET, FILM COATED ORAL EVERY 6 HOURS PRN
Qty: 30 TABLET | Refills: 0 | Status: SHIPPED | OUTPATIENT
Start: 2021-04-12 | End: 2022-10-04

## 2021-04-12 RX ORDER — OXYCODONE HYDROCHLORIDE 5 MG/1
5 TABLET ORAL EVERY 6 HOURS PRN
Qty: 12 TABLET | Refills: 0 | Status: SHIPPED | OUTPATIENT
Start: 2021-04-12 | End: 2022-10-04

## 2021-04-12 ASSESSMENT — MIFFLIN-ST. JEOR
SCORE: 1093.45
SCORE: 1093.45

## 2021-05-03 ENCOUNTER — IMMUNIZATION (OUTPATIENT)
Dept: NURSING | Facility: CLINIC | Age: 61
End: 2021-05-03
Payer: COMMERCIAL

## 2021-05-03 PROCEDURE — 0001A PR COVID VAC PFIZER DIL RECON 30 MCG/0.3 ML IM: CPT

## 2021-05-03 PROCEDURE — 91300 PR COVID VAC PFIZER DIL RECON 30 MCG/0.3 ML IM: CPT

## 2021-05-24 ENCOUNTER — IMMUNIZATION (OUTPATIENT)
Dept: NURSING | Facility: CLINIC | Age: 61
End: 2021-05-24
Attending: INTERNAL MEDICINE
Payer: COMMERCIAL

## 2021-05-24 PROCEDURE — 0002A PR COVID VAC PFIZER DIL RECON 30 MCG/0.3 ML IM: CPT

## 2021-05-24 PROCEDURE — 91300 PR COVID VAC PFIZER DIL RECON 30 MCG/0.3 ML IM: CPT

## 2021-05-25 ENCOUNTER — RECORDS - HEALTHEAST (OUTPATIENT)
Dept: ADMINISTRATIVE | Facility: CLINIC | Age: 61
End: 2021-05-25

## 2021-05-27 NOTE — TELEPHONE ENCOUNTER
Medication Question or Clarification  Who is calling: [facility]: [facility]  What medication are you calling about?    hydrOXYzine pamoate (VISTARIL) 25 MG capsule 30 capsule 0 3/20/2019     Sig - Route: Take 1 capsule (25 mg total) by mouth at bedtime as needed for itching. - Oral        Who prescribed the medication?: Davin Nolen MD    What is your question/concern?: Fax received from pharmacy, the above medication is on backorder until late April.   Will the provider send a new RX for Hydroxyzine HCL tablets?  Pharmacy: [facility]  Okay to leave a detailed message?: No-speak to pharmacy staff  Site CMT - Please call the pharmacy to obtain any additional needed information.

## 2021-05-27 NOTE — TELEPHONE ENCOUNTER
This is extremely common medication.  Please ask her to go to different pharmacy as this is effective and very common.

## 2021-05-27 NOTE — TELEPHONE ENCOUNTER
Pt contacted. She has already transferred this Rx to [facility]. H.DeGree, CMA

## 2021-05-28 ENCOUNTER — RECORDS - HEALTHEAST (OUTPATIENT)
Dept: ADMINISTRATIVE | Facility: CLINIC | Age: 61
End: 2021-05-28

## 2021-05-28 NOTE — PROGRESS NOTES
"Chief Complaint   Patient presents with     Headache     dry cough , congestion, swollen throat, no fever        HPI: Patient presents with swollen throat for 2 days in duration with mild headache and a minimally productive cough.  This is in the context of having a great deal of stress recently.    ROS: No fever vomiting or diarrhea    SH:    reports that she quit smoking about 2 years ago. She has never used smokeless tobacco.      FH: The Patient's family history includes Breast cancer (age of onset: 68) in her maternal aunt; Breast cancer (age of onset: 70) in her maternal aunt; Colon cancer (age of onset: 29) in her mother.     Meds:  Nohelia has a current medication list which includes the following prescription(s): metoprolol succinate, albuterol, hydroxyzine pamoate, metoprolol succinate, penicillin vk, and temazepam.    O:  /80   Pulse 63   Temp 98.5  F (36.9  C)   Ht 5' 3\" (1.6 m)   Wt 127 lb (57.6 kg)   SpO2 98%   Breastfeeding? No   BMI 22.50 kg/m     Constitutional:    --Vitals as above  --No acute distress  Eyes-  --Sclera noninjected  --Lids and conjunctiva normal  ENT-  --TMs clear  --Sclera noninjected  --Pharynx moderately erythematous with PND  Neck-  --Neck supple    Lymph-  --mild cervical lymphadenopathy  Lungs-  --Clear to Auscultation  Heart-  --Regular rate and rhythm  Skin-  --Pink and dry          A/P:   1. Headache  - Rapid Strep A Screen-Throat  - Group A Strep, RNA Direct Detection, Throat  --Ibuprofen for pain    2. Pharyngitis, unspecified etiology  - penicillin VK (PEN VK) 500 MG tablet; Take 1 tablet (500 mg total) by mouth 4 (four) times a day for 10 days.  Dispense: 40 tablet; Refill: 0                                          "

## 2021-05-30 ENCOUNTER — RECORDS - HEALTHEAST (OUTPATIENT)
Dept: ADMINISTRATIVE | Facility: CLINIC | Age: 61
End: 2021-05-30

## 2021-05-31 VITALS — BODY MASS INDEX: 25.76 KG/M2 | WEIGHT: 142 LBS

## 2021-05-31 VITALS — HEIGHT: 63 IN | WEIGHT: 139 LBS | BODY MASS INDEX: 24.63 KG/M2

## 2021-05-31 VITALS — BODY MASS INDEX: 25.4 KG/M2 | WEIGHT: 140 LBS

## 2021-06-01 VITALS — WEIGHT: 131.5 LBS | BODY MASS INDEX: 23.29 KG/M2

## 2021-06-01 NOTE — PROGRESS NOTES
Chief Complaint   Patient presents with     Urinary Frequency     Pt c/o blood in urine, frequent urination and pain at end of flow x 2 days       HPI: Krystal presents today with a 2-day history of mild burning dysuria and burning with urination.  Old records reviewed from 6-11 shows that she had urinary tract infection at that time and actually in February 2011 as well.    ROS: No back pain no fever no chills.  Positive hematuria.    SH:    reports that she quit smoking about 3 years ago. She has never used smokeless tobacco.      FH: The Patient's family history includes Breast cancer (age of onset: 68) in her maternal aunt; Breast cancer (age of onset: 70) in her maternal aunt; Colon cancer (age of onset: 29) in her mother.     Meds:  Nohelia has a current medication list which includes the following prescription(s): hydroxyzine pamoate, metoprolol succinate, temazepam, metoprolol succinate, and sulfamethoxazole-trimethoprim.    O:  /78   Pulse 63   Resp 16   Wt 129 lb 5 oz (58.7 kg)   SpO2 99%   BMI 22.91 kg/m    Alert conversant no acute distress 9 skin pink and dry  UA positive    A/P:   1. Frequent urination  Patient has a positive UA with symptoms.  Will treat with Bactrim, increase fluids and rest, cranberry pills as needed, and return if not improving.  - Urinalysis Macroscopic  - sulfamethoxazole-trimethoprim (BACTRIM DS) 800-160 mg per tablet; Take 1 tablet by mouth 2 (two) times a day for 7 days.  Dispense: 14 tablet; Refill: 0

## 2021-06-02 VITALS — WEIGHT: 127 LBS | HEIGHT: 63 IN | BODY MASS INDEX: 22.5 KG/M2

## 2021-06-02 VITALS — BODY MASS INDEX: 22.23 KG/M2 | WEIGHT: 125.5 LBS

## 2021-06-02 VITALS — BODY MASS INDEX: 22.92 KG/M2 | WEIGHT: 129.38 LBS

## 2021-06-02 VITALS — WEIGHT: 128.19 LBS | BODY MASS INDEX: 22.71 KG/M2

## 2021-06-02 NOTE — TELEPHONE ENCOUNTER
Refill Approved    Rx renewed per Medication Renewal Policy. Medication was last renewed on 4/15/19.    Giovanna Johnston, [facility] Triage/Med Refill 11/3/2019     Requested Prescriptions   Pending Prescriptions Disp Refills     metoprolol succinate (TOPROL-XL) 50 MG 24 hr tablet [Pharmacy Med Name: METOPROLOL ER SUCCINATE 50MG TABS] 90 tablet 0     Sig: TAKE 1 TABLET BY MOUTH ONCE DAILY       Beta-Blockers Refill Protocol Passed - 11/3/2019  9:15 AM        Passed - PCP or prescribing provider visit in past 12 months or next 3 months     Last office visit with prescriber/PCP: 9/25/2019 Davin Nolen MD OR same dept: 9/25/2019 Davin Nolen MD OR same specialty: 9/25/2019 Davin Nolen MD  Last physical: Visit date not found Last MTM visit: Visit date not found   Next visit within 3 mo: Visit date not found  Next physical within 3 mo: Visit date not found  Prescriber OR PCP: Davin Nolen MD  Last diagnosis associated with med order: 1. HTN (hypertension)  - metoprolol succinate (TOPROL-XL) 50 MG 24 hr tablet [Pharmacy Med Name: METOPROLOL ER SUCCINATE 50MG TABS]; TAKE 1 TABLET BY MOUTH ONCE DAILY  Dispense: 90 tablet; Refill: 0    If protocol passes may refill for 12 months if within 3 months of last provider visit (or a total of 15 months).             Passed - Blood pressure filed in past 12 months     BP Readings from Last 1 Encounters:   09/25/19 124/78

## 2021-06-03 VITALS
DIASTOLIC BLOOD PRESSURE: 78 MMHG | SYSTOLIC BLOOD PRESSURE: 124 MMHG | HEART RATE: 63 BPM | OXYGEN SATURATION: 99 % | RESPIRATION RATE: 16 BRPM | WEIGHT: 129.31 LBS | BODY MASS INDEX: 22.91 KG/M2

## 2021-06-04 VITALS
WEIGHT: 122.38 LBS | HEART RATE: 70 BPM | RESPIRATION RATE: 16 BRPM | OXYGEN SATURATION: 100 % | DIASTOLIC BLOOD PRESSURE: 80 MMHG | BODY MASS INDEX: 23.11 KG/M2 | SYSTOLIC BLOOD PRESSURE: 110 MMHG | HEIGHT: 61 IN

## 2021-06-04 VITALS
HEART RATE: 62 BPM | DIASTOLIC BLOOD PRESSURE: 78 MMHG | SYSTOLIC BLOOD PRESSURE: 114 MMHG | OXYGEN SATURATION: 100 % | TEMPERATURE: 98 F | BODY MASS INDEX: 21.79 KG/M2 | RESPIRATION RATE: 18 BRPM | WEIGHT: 123 LBS

## 2021-06-05 VITALS
WEIGHT: 127 LBS | BODY MASS INDEX: 23.98 KG/M2 | HEIGHT: 61 IN | BODY MASS INDEX: 23.98 KG/M2 | HEIGHT: 61 IN | WEIGHT: 127 LBS

## 2021-06-05 VITALS
HEIGHT: 61 IN | DIASTOLIC BLOOD PRESSURE: 86 MMHG | BODY MASS INDEX: 23.22 KG/M2 | OXYGEN SATURATION: 98 % | HEART RATE: 85 BPM | SYSTOLIC BLOOD PRESSURE: 140 MMHG | WEIGHT: 123 LBS

## 2021-06-05 VITALS
BODY MASS INDEX: 23.74 KG/M2 | HEART RATE: 64 BPM | OXYGEN SATURATION: 97 % | HEIGHT: 62 IN | DIASTOLIC BLOOD PRESSURE: 78 MMHG | SYSTOLIC BLOOD PRESSURE: 130 MMHG | WEIGHT: 129 LBS

## 2021-06-05 VITALS
HEART RATE: 55 BPM | BODY MASS INDEX: 23.6 KG/M2 | DIASTOLIC BLOOD PRESSURE: 80 MMHG | SYSTOLIC BLOOD PRESSURE: 118 MMHG | WEIGHT: 125 LBS | OXYGEN SATURATION: 98 % | HEIGHT: 61 IN

## 2021-06-05 NOTE — TELEPHONE ENCOUNTER
Who is calling:  Patient   Reason for Call:  [facility] for [facility] patch pharmacy stated that  They did not received the prescription . Patient requesting clinic to send prescription for nicotine patch as soon as possible. Up on completion of sending prescription please call patient and inform.   Date of last appointment with primary care: 09/25/19  Okay to leave a detailed message: No

## 2021-06-05 NOTE — TELEPHONE ENCOUNTER
Medication Request  Medication name:   nicotine (NICODERM CQ) 21 mg/24 hr (Discontinued) 30 patch 1 5/2/2016 6/22/2016 --   Sig - Route: Place 1 patch on the skin daily. - Transdermal   Reason for Discontinue: Reorder   E-Prescribing Status: Receipt confirmed by pharmacy (5/2/2016  9:05 AM CDT)       [facility]  Reason for request: Would like to stop smoking again. Started 9 months ago and would like to stop again.    Okay to leave a detailed message: yes

## 2021-06-07 NOTE — TELEPHONE ENCOUNTER
Medication Request  Medication name:   nicotine (NICODERM CQ) 21 mg/24 hr (Discontinued)  30 patch  1  5/2/2016 6/22/2016  --    Sig - Route: Place 1 patch on the skin daily. - Transdermal        [facility]ed [facility]  Reason for request: Would like to quit smoking. Would like to start with the 21 mg patch first.  When did you use medication last?:  Off and on last January to February.  Patient offered appointment:  no  Okay to leave a detailed message: yes

## 2021-06-08 NOTE — TELEPHONE ENCOUNTER
Patient calling, has a history of UTI's in the past.    Reason for Disposition    Urinating more frequently than usual (i.e., frequency)    Additional Information    Negative: Shock suspected (e.g., cold/pale/clammy skin, too weak to stand, low BP, rapid pulse)    Negative: Sounds like a life-threatening emergency to the triager    Negative: Followed a genital area injury    Negative: Followed a genital area injury (penis, scrotum)    Negative: Vaginal discharge    Negative: Pus (white, yellow) or bloody discharge from end of penis    Negative: [1] Taking antibiotic for urinary tract infection (UTI) AND [2] female    Negative: [1] Taking antibiotic for urinary tract infection (UTI) AND [2] male    Negative: [1] Discomfort (pain, burning or stinging) when passing urine AND [2] pregnant    Negative: [1] Discomfort (pain, burning or stinging) when passing urine AND [2] postpartum (from 0 to 6 weeks after delivery)    Negative: [1] Discomfort (pain, burning or stinging) when passing urine AND [2] female    Negative: [1] Discomfort (pain, burning or stinging) when passing urine AND [2] male    Negative: Pain or itching in the vulvar area    Negative: Pain in scrotum is main symptom    Negative: Blood in the urine is main symptom    Negative: Symptoms arising from use of a urinary catheter (Recinos or Coude)    Negative: [1] Unable to urinate (or only a few drops) > 4 hours AND     [2] bladder feels very full (e.g., palpable bladder or strong urge to urinate)    Negative: [1] Decreased urination and [2] drinking very little AND [2] dehydration suspected (e.g., dark urine, no urine > 12 hours, very dry mouth, very lightheaded)    Negative: Patient sounds very sick or weak to the triager    Negative: Fever > 100.5 F (38.1 C)    Negative: [1] Can't control passage of urine (i.e., urinary incontinence) AND [2] new onset (< 2 weeks) or worsening    Negative: Side (flank) or lower back pain present    Answer Assessment - Initial  "Assessment Questions  1. SYMPTOM: \"What's the main symptom you're concerned about?\" (e.g., frequency, incontinence)      Urgency, frequency, burning  2. ONSET: \"When did the  symptoms  start?\"      An hour ago  3. PAIN: \"Is there any pain?\" If so, ask: \"How bad is it?\" (Scale: 1-10; mild, moderate, severe)      burning  4. CAUSE: \"What do you think is causing the symptoms?\"      UTI  5. OTHER SYMPTOMS: \"Do you have any other symptoms?\" (e.g., fever, flank pain, blood in urine, pain with urination)      Small blood at the end of last void, no other symptoms  6. PREGNANCY: \"Is there any chance you are pregnant?\" \"When was your last menstrual period?\"      no    Protocols used: URINARY SYMPTOMS-A-AH      "

## 2021-06-08 NOTE — TELEPHONE ENCOUNTER
Medication Question or Clarification  Who is calling: patient  What medication are you calling about (include dose and sig)?:     nicotine (NICODERM CQ) 14 mg/24 hr  30 patch  3  5/7/2020 6/6/2020     Sig - Route: Place 1 patch on the skin daily. - Transdermal       Who prescribed the medication?: Davin Nolen MD  What is your question/concern?: Patient calling reports she got the wrong prescription. Hoping to get a prescription for the 21 mg patches instead. Please advise!  [facility]  Okay to leave a detailed message?: Yes

## 2021-06-08 NOTE — TELEPHONE ENCOUNTER
Who is calling:  Patient   Reason for Call:  Calling back checking the status of medication request .  Date of last appointment with primary care:   Okay to leave a detailed message: Yes

## 2021-06-09 NOTE — PROGRESS NOTES
Chief Complaint   Patient presents with     Hematuria     HPI: Nohelia Mcdaniels is a 59 y.o. female who complains of dysuria, urinary frequency, & hematuria onset today. No treatments tried. No known alleviating or aggravating factors. Symptoms are moderate in severity & constant in duration. Denies vaginal discharge, fever/chills, genital sores, abd pain, flank pain, N/V/D, and any other symptoms.    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  Acute Pharyngitis  Arthropathy Of Multiple Sites  Feeling Tired Or Poorly      Past Medical History:   Diagnosis Date     Benign colonic polyp 2015    had pre-cancerous polps removed       Past Surgical History:   Procedure Laterality Date     [place] HEMORRHOIDECTOMY INTERNAL RUBBER BAND LIGATIONS      Description: Hemorrhoidectomy;  Proc Date: 2010;     Social History     Tobacco Use     Smoking status: Former Smoker     Last attempt to quit: 2016     Years since quittin.2     Smokeless tobacco: Never Used   Substance Use Topics     Alcohol use: Not on file       Review of Systems   Constitutional: Negative for chills and fever.   Respiratory: Negative for shortness of breath.    Cardiovascular: Negative for chest pain.   Gastrointestinal: Negative for diarrhea, nausea and vomiting.   Genitourinary: Positive for dysuria and frequency. Negative for flank pain and hematuria.   All other systems reviewed and are negative.      Vitals:    20 0717   BP: 114/78   Patient Site: Right Arm   Patient Position: Sitting   Cuff Size: Adult Regular   Pulse: 62   Resp: 18   Temp: 98  F (36.7  C)   TempSrc: Oral   SpO2: 100%   Weight: 123 lb (55.8 kg)       Physical Exam   Constitutional: She appears well-developed and well-nourished.   Pulmonary/Chest: Effort normal.   Abdominal: Normal appearance. There is no  abdominal tenderness. There is no CVA tenderness.   Neurological: She is alert.   Skin: Skin is warm and dry.   Psychiatric: She has a normal mood and affect.         Labs:  Recent Results (from the past 24 hour(s))   Urinalysis-UC if Indicated   Result Value Ref Range    Color, UA Yellow Colorless, Yellow, Straw, Light Yellow    Clarity, UA Cloudy (!) Clear    Glucose, UA Negative Negative    Bilirubin, UA Negative Negative    Ketones, UA Negative Negative    Specific Gravity, UA 1.015 1.005 - 1.030    Blood, UA Large (!) Negative    pH, UA 6.0 5.0 - 8.0    Protein, UA 30 mg/dL (!) Negative mg/dL    Urobilinogen, UA 0.2 E.U./dL 0.2 E.U./dL, 1.0 E.U./dL    Nitrite, UA Negative Negative    Leukocytes, UA Large (!) Negative    Bacteria, UA Few (!) None Seen hpf    RBC, UA  (!) None Seen, 0-2 hpf    WBC, UA 25-50 (!) None Seen, 0-5 hpf    Squam Epithel, UA 0-5 None Seen, 0-5 lpf    WBC Clumps Present (!) None Seen    Trans Epithel, UA 0-5 (!) None Seen lpf       Radiology:  No results found.    Clinical Decision Making:    UA c/w UTI; pt afebrile and no s/sx pyelonephritis. Will prescribe antibiotic. Culture pending. Encouraged pt to push fluids.  Advised f/u with PCP in 2 weeks to ensure resolution of hematuria.      At the end of the encounter, I discussed results, diagnosis, medications. Discussed red flags for immediate return to clinic/ER, as well as indications for follow up if no improvement. Patient understood and agreed to plan. Patient was stable for discharge.    1. Acute cystitis with hematuria  nitrofurantoin, macrocrystal-monohydrate, (MACROBID) 100 MG capsule   2. Blood in urine  Urinalysis-UC if Indicated    Culture, Urine         Return in about 2 weeks (around 8/6/2020) for Recheck with primary care provider.    JARRELL Ward, PAGuillerminaC  [facility]

## 2021-06-11 NOTE — PROGRESS NOTES
Chief Complaint   Patient presents with     Sinus Problem     facial pain, heaviness, cough, jaw pain, sinus pressure        HPI    Patient is here for over a week of nasal discharge with congestion and facial pressure, with a productive cough. No fever, chills, chest pain, shortness of breath.     ROS: Pertinent ROS noted in HPI.     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     Social History Main Topics     Smoking status: Former Smoker     Quit date: 5/2/2016     Smokeless tobacco: Not on file     Alcohol use Not on file     Drug use: Not on file     Sexual activity: Not on file     Other Topics Concern     Not on file     Social History Narrative         Objective:    Vitals:    06/20/17 1722   BP: 136/84   Pulse: 93   Resp: 20   Temp: 98.8  F (37.1  C)   SpO2: 96%       Gen: NAD  Throat :normal  Ears: Normal TMs and canals  Nose: congested nasal mucosa with purulent discharges  Sinus: Moderate tenderness to percussion of right maxillary sinuses  Neck: No adenopathy  CV: RRR, no M, R, G  Pulm: CTAB, normal effort        Acute sinusitis  -     amoxicillin-clavulanate (AUGMENTIN) 875-125 mg per tablet; Take 1 tablet by mouth 2 (two) times a day for 10 days.      Supportive cares and f/u as directed.

## 2021-06-12 NOTE — PROGRESS NOTES
ASSESSMENT:   Patient with wheezing and crackles in right LL fields.  Will try azithromycin and albuterol for wheezing and chest tightness.  Not concerned for acute cardiac pathology at this time, as chest symptoms associated with cough more than exercise and denies pressure or shortness of breath.  Recommend close follow up with PCP and spirometry to assess for COPD changes.  Likely that she has a component of chronic lung disease at play here.     PLAN:  Follow up with PCP 1-2 weeks  Azithromycin and albuterol - use q4 for two days then PRN for cough with exercise.     Raquel Sanabria MD    SUBJECTIVE:   Nohelia Mcdaniels is a 56 y.o. female presents today, with concerns of a cough.  She has had a cough for one week.  Associated headache, chest pain. She has been on mucinex for cough medicine, which doesn't seem to be helping.  She feels chest heaviness. Her cough has been productive and feels tight.  No fevers today, potentially earlier in course.  No runny nose, nasal congestion or sore throat during this time.      Former 35 pack year smoker. She has not had chest colds since quitting smoking. She admits to being under a lot of stress at work.  She notes that she has had many bouts of bronchitis and pleurisy, pneumonia when she was smoking. She has never noted shortness of breath when running (avid runner) or in her daily life.     She notes that she joined a gym three weeks ago and tends to exacerbate her cough.     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        Allergies:   No Known Allergies    OBJECTIVE:   Vitals:    07/23/17 0823   BP: 120/80   Patient Site: Right Arm   Patient Position: Sitting   Cuff Size: Adult Regular   Pulse: 70   Resp: 14   Temp: 98.1  F (36.7  C)   SpO2: 96%   Weight: 140 lb (63.5 kg)     Physical exam reveals a 56 y.o. female.   Appears healthy, alert and cooperative.  Eyes: no conjunctivitis, lids normal.   Ears:  normal TMs bilaterally  Nose:    Mucosa normal. Scant, clear rhinorrhea.  Mouth:  Mucosa pink and moist.  no pharyngitis, no exudate   Lymph: No cervical lymphadenopathy.   Lungs:  Lungs with expiratory wheezes in lung fields throughout.  Crackles appreciated at right lung base.  Symmetric air entry throughout both lung fields.  Heart: regular rate and rhythm, no murmur, rub or gallop  Skin: pink, warm, dry. No lesions/rash

## 2021-06-12 NOTE — PROGRESS NOTES
DATE OF SERVICE: 09/07/2017    A very pleasant 56-year-old lady comes in today for her annual physical exam.  The  patient is healthy, has no concerns.  She does the breast self-exam a regular basis.   Her bowels have been normal and her colonoscopy is up to date.  Mammogram is up to  date and old mammograms reviewed from the last year.  She has cyst and it was  treated with ultrasound and found to be benign.  The patient has active and socially  she works as a property management field.  The family history is reviewed, strongly  positive for hypertension.  In addition, socially, she has stopped smoking for  approximately 1 year and noted that her blood pressure is extremely well controlled.   Unfortunately, she was on amlodipine and that has been switched by the pharmacy due  to a national shortage and she is frustrated about this.    Patient has a history of hyperlipidemia and is attempting to manage this by diet.   She also has mild pain in her left iliotibial band that has been diagnosed by her  .    OBJECTIVE:  VITAL SIGNS:  Temperature was afebrile, pulse 70, respirations 12, blood pressure  120/72.  HEENT:  Normal.  SKIN:  Pink and dry.  NECK:  Supple.    LUNGS:  She was in no respiratory distress.    ASSESSMENT:    1.  Healthy female.  2.  Hypertension.  3.  Hyperlipidemia.    PLAN:    1.  Recommend flu shot.  Patient declined.  2.  Recommend a colonoscopy and gave referral as such.  3.  Recommend mammograms.  The patient will schedule.  4.  Encouraged healthy diet.  5.  Encouraged monitoring cholesterol and we will check it at the next visit.  6.  Patient will monitor blood pressure and I gave her parameters to look for.  If  the blood pressure still above 130 to 135 range within 3 months, we will consider  restarting medication but right now we will discontinue the metoprolol at her  request.  7.  Follow up in 1 year.      MD german BHATT 09/07/2017 17:22:16  T 09/07/2017  19:09:03  R 09/07/2017 19:16:23  [id number]        cc:JOE AMAYA MD

## 2021-06-13 NOTE — TELEPHONE ENCOUNTER
Patient is asking if she can take cold meds if she is being tested later for covid.  Yes ok.  Will not alter test.    Hollie Tang RN [facility] Triage Nurse Advisor

## 2021-06-13 NOTE — TELEPHONE ENCOUNTER
Refill Approved    Rx renewed per Medication Renewal Policy. Medication was last renewed on 11/3/19.    Susanne Harden, [facility] Triage/Med Refill 11/20/2020     Requested Prescriptions   Pending Prescriptions Disp Refills     metoprolol succinate (TOPROL-XL) 50 MG 24 hr tablet 90 tablet 3     Sig: Take 1 tablet (50 mg total) by mouth daily.       Beta-Blockers Refill Protocol Passed - 11/18/2020  2:07 PM        Passed - PCP or prescribing provider visit in past 12 months or next 3 months     Last office visit with prescriber/PCP: 9/1/2020 Davin Nolen MD OR same dept: 9/1/2020 Davin Nolen MD OR same specialty: 9/1/2020 Davin Nolen MD  Last physical: Visit date not found Last MTM visit: Visit date not found   Next visit within 3 mo: Visit date not found  Next physical within 3 mo: Visit date not found  Prescriber OR PCP: Davin Nolen MD  Last diagnosis associated with med order: 1. HTN (hypertension)  - metoprolol succinate (TOPROL-XL) 50 MG 24 hr tablet; Take 1 tablet (50 mg total) by mouth daily.  Dispense: 90 tablet; Refill: 3    If protocol passes may refill for 12 months if within 3 months of last provider visit (or a total of 15 months).             Passed - Blood pressure filed in past 12 months     BP Readings from Last 1 Encounters:   09/01/20 110/80                            
Request from [facility] pharmacy to refill patient's metoprolol prescription.    11/18/2020  
movement

## 2021-06-13 NOTE — PROGRESS NOTES
"Nohelia Mcdaniels is a 60 y.o. female who is being evaluated via a billable telephone visit.      The patient has been notified of following:     \"This telephone visit will be conducted via a call between you and your physician/provider. We have found that certain health care needs can be provided without the need for a physical exam.  This service lets us provide the care you need with a short phone conversation.  If a prescription is necessary we can send it directly to your pharmacy.  If lab work is needed we can place an order for that and you can then stop by our lab to have the test done at a later time.    Telephone visits are billed at different rates depending on your insurance coverage. During this emergency period, for some insurers they may be billed the same as an in-person visit.  Please reach out to your insurance provider with any questions.    If during the course of the call the physician/provider feels a telephone visit is not appropriate, you will not be charged for this service.\"    Patient has given verbal consent to a Telephone visit? Yes    What phone number would you like to be contacted at? [phone]    Patient would like to receive their AVS by AVS Preference: Reuben.    Additional provider notes: Patient presents today with 3 days of ill symptoms including sinus congestion, dry cough, and sore throat.  She was around her grandson over the weekend who had similar symptoms.  Grandson was tested for Covid and found to be negative.  No loss of taste or smell.  No wheezing or shortness of breath.  Denies chest pain and hemoptysis.  Outside of this minor illness, doing well.    Assessment/Plan:  1. Cough    Rule out COVID-19 although I have low suspicion.  If negative, likely simple viral URI.  Symptom management.  Keep us updated with symptoms if they worsen or fail to improve over the next 10 days.    - Symptomatic COVID-19 Virus (CORONAVIRUS) PCR; Future        Phone call duration:  10 " minutes    Rolando Franks, CNP

## 2021-06-14 NOTE — PATIENT INSTRUCTIONS - HE
You can go ahead and restart the metoprolol, particularly since it was helping your headaches.    I sent in the prescription for the desonide cream.  Apply a thin layer over the affected area twice daily.    If this does not get better after 10 days, let me know we can connect with dermatology.    For the back, let's connect you with physical therapy.  They will call you.  If this does not get better after a few sessions, let me know and we can move onto next steps.

## 2021-06-14 NOTE — PROGRESS NOTES
Chief Complaint   Patient presents with     Blood Pressure Check     Was taken off of bp meds and started getting headaches. Thinking she may need to be back on.      Back Pain     Left side. Hurting since December.      Mouth Lesions     Has something on top lip that she would like looked at.        HPI: Patient presents today with a few concerns. First, she notes that starting in December she started to get a pain in her left lower back. It is worse with running and physical exertion. One time it did radiate down the buttocks into the leg. Described as a stabbing sensation. No urinary symptoms. Afebrile without chills. No saddle anesthesia or bowel/bladder incontinence. Not sure what triggered it, but she has needed a heating pad after shoveling. Has not done physical therapy for.    Patient's blood pressure today is borderline elevated. No chest pain, palpitations, lightheadedness, dizziness. Previously was on metoprolol which was discontinued. Unfortunately once the metoprolol was discontinued her frequent headaches returned. No neuro deficits with them.    Lastly, patient notes a spot along her upper lip which is slowly been getting bigger over the last year. Feels similar to a rash that she had on her right upper eyelid about 4-5 years ago. No itching. No pain. Occasionally feels dry. No oral lesions. No new make-up or moisturizers.    ROS:Review of Systems - negative except for what's listed in the HPI    SH: The Patient's  reports that she quit smoking about 4 years ago. She has never used smokeless tobacco.      FH: The Patient's family history includes Breast cancer (age of onset: 68) in her maternal aunt; Breast cancer (age of onset: 70) in her maternal aunt; Colon cancer (age of onset: 29) in her mother.     Meds:    Current Outpatient Medications on File Prior to Visit   Medication Sig Dispense Refill     calcium carbonate (CALCIUM 600 ORAL) Take by mouth.       coenzyme Q10 (CO Q-10) 10 mg capsule Take  "10 mg by mouth daily.       fish oil-omega-3 fatty acids (FISH OIL) 300-1,000 mg capsule Take 2 g by mouth daily.       glucosamine HCl/chondroitin chandler (GLUCOSAMINE-CHONDROITIN ORAL) Take by mouth.       multivitamin with minerals (THERA-M) 9 mg iron-400 mcg Tab tablet Take 1 tablet by mouth daily.       [DISCONTINUED] metoprolol succinate (TOPROL-XL) 50 MG 24 hr tablet Take 1 tablet (50 mg total) by mouth daily. 90 tablet 2     [DISCONTINUED] nicotine (NICODERM CQ) 21 mg/24 hr Place 1 patch on the skin daily. 30 patch 1     [DISCONTINUED] nicotine, polacrilex, (NICORETTE) 2 mg lozenge Apply 1 lozenge (2 mg total) to the mouth or throat as needed for smoking cessation (No more than 4 per day). 20 lozenge 5     No current facility-administered medications on file prior to visit.        O:  /86   Pulse 85   Ht 5' 1\" (1.549 m)   Wt 123 lb (55.8 kg)   SpO2 98%   BMI 23.24 kg/m      Physical Examination:   General appearance - alert, well appearing, and in no distress  Mental status - alert, oriented to person, place, and time  Eyes -PERRLA  Chest - clear to auscultation, no wheezes, rales or rhonchi, symmetric air entry  Heart - normal rate and regular rhythm, S1 and S2 normal, no murmurs noted  Abdomen - soft, nontender, nondistended, no masses or organomegaly  Neurological - alert, oriented, normal speech, no focal findings or movement disorder noted, neck supple without rigidity, cranial nerves II through XII intact, motor and sensory grossly normal bilaterally, normal muscle tone, no tremors, strength 5/5  Musculoskeletal - no joint tenderness, deformity or swelling  Extremities - peripheral pulses normal, no peripheral edema  Skin -light red scaly rash measuring approximately 4 mm in diameter above of the right upper lip.      A/P:     Problem List Items Addressed This Visit     Headache      Other Visit Diagnoses     HTN (hypertension)    -  Primary    Relevant Medications    metoprolol succinate " (TOPROL-XL) 50 MG 24 hr tablet    Acute left-sided low back pain without sciatica        Relevant Orders    Ambulatory referral to PT/OT    Eczema, unspecified type        Relevant Medications    desonide (DESOWEN) 0.05 % cream            1. HTN (hypertension)  Only mildly elevated, but she did notice an improvement with her headaches while on it so it could certainly be acting as a prophylaxis as well. She can continue to take this. Updated prescription sent to the pharmacy.    - metoprolol succinate (TOPROL-XL) 50 MG 24 hr tablet; Take 1 tablet (50 mg total) by mouth daily.  Dispense: 90 tablet; Refill: 0    2. Acute left-sided low back pain without sciatica  Stable with no red flags. Trial of physical therapy. If no improvement, figure out next dose based on evolution of pain.    - Ambulatory referral to PT/OT    3. Eczema, unspecified type  Trial of desonide cream. Use sparingly. Let me know if not improving.    - desonide (DESOWEN) 0.05 % cream; Apply topically 2 (two) times a day.  Dispense: 15 g; Refill: 0    4. Nonintractable headache, unspecified chronicity pattern, unspecified headache type  Hopefully will be under better control with resumption of metoprolol.    Total time spent was at least 30 minutes including reviewing records prior to arrival, consultation, placing orders, education, and reviewing the plan of care.      Rolando Franks, CNP      This note has been dictated using voice recognition software. Any grammatical or context distortions are unintentional and inherent to the software.

## 2021-06-14 NOTE — PROGRESS NOTES
[facility]   [facility]-Pelvic Initial Evaluation    Patient Name: Nohelia Mcdaniels  Date of evaluation: 2/1/2021  Referral Diagnosis: Lower back pain  Referring provider: Rolando Franks CNP  Visit Diagnosis:     ICD-10-CM    1. Acute bilateral low back pain without sciatica  M54.5    2. Myofascial pain  M79.18        Assessment:        Nohelia Mcdaniels is a 60 y.o. female who presents to PT today with lower back pain which started on the left in Dec 2020 and has progressed into the right side recently.. She is not limited with daily tasks as she is able to complete all tasks despite her pain. She does have fascial hypomobilities present which will contribute to her current symptoms. She is appropriate for skilled PT to allow her to reach all stated goals.     Goals:  Pt. will demonstrate/verbalize independence in self-management of condition in : 6 weeks  Pt. will report decreased intensity, frequency of : Pain;in 6 weeks;Comment  Comment:: decrease pain from 0-3/10 to 0-1/10 with ADLs.  Patient will return to: exercise;for full duty;in 12 weeks;Comment  Comment: without an increase in pain    No data recorded    Patient's expectations/goals are realistic    Barriers to Learning or Achieving Goals:  No Barriers.       Plan / Patient Instructions:        Plan of Care:   Communication with: Referral Source  Patient Related Instruction: Nature of Condition;Treatment plan and rationale;Self Care instruction;Basis of treatment;Posture;Body mechanics;Precautions;Expected outcome;Next steps  Times per Week: 1  Number of Weeks: 8  Number of Visits: 8  Discharge Planning: discharge to [facility] for self-management of sx once goals are met  Therapeutic Exercise: ROM;Stretching;Strengthening  Neuromuscular Reeducation: kinesio tape;posture;balance/proprioception;TNE;core  Manual Therapy: soft tissue mobilization;myofascial release;joint mobilization;muscle energy;strain counterstrain      Plan for next visit: initiate SCS/MT  and progress ex as tolerated.      Subjective:         Social information:   Occupation:retired   Work Status:NA    History of Present Illness:    Nohelia is a 60 y.o. female who presents to therapy today with complaints of LBP. This started in in the L side in Dec 2020 and recently has been having some R lower back pain. It is not continuous. It can come on anywhere from 2-20 times a day but it does seem to be getting worse. The pain is stabbing. There was only one instance of buttock pain.    She does workout 3-4x/week and is very active.    Function: running her 5 miles will bring on the pain, returning from bending can bring it on, any activity can be painful if aggravated but it doesn't seem to stop her from anything.    She did have a little earl with diarrhea during the holidays but other than that she really hasn't had issues with bowel/bladder.    She describes their previous level of function as not limited .   Denies N/T into the LE's.     Pain Ratin  Pain rating at best: 0  Pain rating at worst: 3  Pain description: aching, pain, sharp and shooting    Patient reports no benefit from  rest  , movement or exercise , heat         Objective:      Note: Items left blank indicates the item was not performed or not indicated at the time of the evaluation.    Patient Outcome Measures :    Modified Oswestry Low Back Pain Disablity Questionnaire  in %: 2     Scores range from 0-100%, where a score of 0% represents minimal pain and maximal function. The minimal clinically important difference is a score reduction of 12%.    Examination  1. Acute bilateral low back pain without sciatica     2. Myofascial pain       Precautions/Restrictions: None  Involved side: Bilateral  Posture Observation:    Standing: level pelvis in standing   Seated: post pelvic tilt.     Lumbar ROM:  2021   Date:      *Indicate scale AROM AROM AROM   Lumbar Flexion WFL     Lumbar Extension WFL      Right Left Right Left Right Left    Lumbar Sidebending         Lumbar Rotation         Thoracic Flexion      Thoracic Extension      Thoracic Sidebending         Thoracic Rotation 50 60         Lower Extremity Strength:   2/1/2021   Date:      LE strength/5 Right Left Right Left Right Left   Hip Flexion (L1-3) 5 5       Hip Extension (L5-S1)         Hip Abduction (L4-5)         Hip Adduction (L2-3)         Hip External Rotation         Hip Internal Rotation         Knee Extension (L3-4) 5 5       Knee Flexion 5 5       Ankle Dorsiflexion (L4-5) 5 5       Great Toe Extension (L5)         Ankle Plantar flexion (S1)         Abdominals          Palpation: Hypomobile fascia of spine, abdomen, thorax. Palpation tenderness noted B L1-2 transverse processes.     Lumbar Special Tests:   2/1/2021   Lumbar Special Tests Right Left SI Tests Right  Left   Quadrant test   SI Compression     Straight leg raise   SI Distraction     Crossover response   POSH Test     Slump   Sacral Thrust     Sit-up test  FADIR     Trunk extensor endurance test  Resisted Abduction     Prone instability test  Other:     Pubic shotgun  Other:         LE Screen:   Hip IR: WFL   Hip ER:  WFL   Hip Flexion:  WFL    Exercises:  Exercise #1: LTR with cross over  Comment #1: Single knee to chest/Double knee to chest  Exercise #2: Pelvic rocking and posture education  Comment #2: Diaphragmatic breathing         Treatment Today   2/1/2021   TREATMENT MINUTES COMMENTS   Evaluation 25 low   Self-care/ Home management     Manual therapy     Neuromuscular Re-education     Therapeutic Activity     Therapeutic Exercises 25 See flow sheet   Gait training     Modality__________________                Total 50    Blank areas are intentional and mean the treatment did not include these items.       PT Evaluation Code: (Please list factors)  Patient History/Comorbidities: none  Examination: 1-2  Clinical Presentation: 1-2  Clinical Decision Making: stable    Patient History/  Comorbidities  Examination  (body structures and functions, activity limitations, and/or participation restrictions) Clinical Presentation Clinical Decision Making (Complexity)   No documented Comorbidities or personal factors 1-2 Elements Stable and/or uncomplicated Low   1-2 documented comorbidities or personal factor 3 Elements Evolving clinical presentation with changing characteristics Moderate   3-4 documented comorbidities or personal factors 4 or more Unstable and unpredictable High               Saul Fierro PT, ATC  2/1/2021  9:05 AM

## 2021-06-15 NOTE — PROGRESS NOTES
[facility] Daily Progress     Patient Name: Nohelia Mcdaniels  Date: 2021  Visit #: 3  PTA visit #:     Referral Diagnosis: LBP   Referring provider: Rolando Franks CNP  Visit Diagnosis:     ICD-10-CM    1. Acute bilateral low back pain without sciatica  M54.5    2. Myofascial pain  M79.18          Assessment:     Patient is benefitting from skilled physical therapy and is making steady progress toward functional goals.  Patient is appropriate to continue with skilled physical therapy intervention, as indicated by initial plan of care.   There was good release of fascial tensions today with treatment. She may benefit from more glut HEP and reach and roll for mobility.     Goal Status:  Pt. will demonstrate/verbalize independence in self-management of condition in : 6 weeks  Pt. will report decreased intensity, frequency of : Pain;in 6 weeks;Comment  Comment:: decrease pain from 0-3/10 to 0-1/10 with ADLs.  Patient will return to: exercise;for full duty;in 12 weeks;Comment  Comment: without an increase in pain    No data recorded    Plan / Patient Education:     Continue with initial plan of care.    Subjective:     Pain Ratin-6  Two days ago she started running 5 miles again. The lower back is feeling great. There is just the occasional pain in the L upper lower back. It doesn't seem to be any more or less frequent. In the last 3 weeks since starting it has been less.    She did feel good after the last treatment.     Objective:     Neural, art, visc fascial tensions.      Treatment Today   2021   TREATMENT MINUTES COMMENTS   Evaluation     Self-care/ Home management     Manual therapy 25 Fascial release using Strain-Counterstrain of B lumbar/lower thor MES-V, L middle abd-V (scar), B lower thor PINT-N, B lumbar/lower thor SMED-A   Neuromuscular Re-education 15 Recip inhib of neural, visc, art fascia   Therapeutic Activity     Therapeutic Exercises 15 AA/PROM to BLE, TL spine, ribs, pelvis.     Gait training     Modality__________________                Total 55    Blank areas are intentional and mean the treatment did not include these items.       Saul Fierro  2/19/2021

## 2021-06-15 NOTE — PROGRESS NOTES
[facility] Daily Progress     Patient Name: Nohelia Mcdaniels  Date: 2021  Visit #: 2  PTA visit #:     Referral Diagnosis: LBP   Referring provider: Rolando Franks CNP  Visit Diagnosis:     ICD-10-CM    1. Acute bilateral low back pain without sciatica  M54.5    2. Myofascial pain  M79.18          Assessment:     Patient is benefitting from skilled physical therapy and is making steady progress toward functional goals.  Patient is appropriate to continue with skilled physical therapy intervention, as indicated by initial plan of care.   She did have good release of fascial tensions with treatment today which should help to improve her LBP. She did report feeling improvement post treatment.     Goal Status:  Pt. will demonstrate/verbalize independence in self-management of condition in : 6 weeks  Pt. will report decreased intensity, frequency of : Pain;in 6 weeks;Comment  Comment:: decrease pain from 0-3/10 to 0-1/10 with ADLs.  Patient will return to: exercise;for full duty;in 12 weeks;Comment  Comment: without an increase in pain    No data recorded    Plan / Patient Education:     Continue with initial plan of care.    Subjective:     Pain Ratin-6  The pain in the upper lower back is getting less but the pain in the lower back is more troublesome. She has been to the chiro a few times to try to fix that.   She has been doing the HEP.     Objective:     Neural, MS, visc fascial tensions.      Treatment Today   2021   TREATMENT MINUTES COMMENTS   Evaluation     Self-care/ Home management     Manual therapy 25 Fascial release using Strain-Counterstrain of B lumbar F (P)-MS, B lower abd-V, B lower thor/lumbar post-gang-N   Neuromuscular Re-education 15 Recip inhib of neural, visc, MS fascia   Therapeutic Activity     Therapeutic Exercises 15 AA/PROM to BLE, TL spine, ribs, pelvis.    Gait training     Modality__________________                Total 55    Blank areas are intentional and mean the  treatment did not include these items.       Saul Fierro  2/9/2021

## 2021-06-15 NOTE — PROGRESS NOTES
[facility] Daily Progress     Patient Name: Nohelia Mcdaniels  Date: 2021  Visit #: 4  PTA visit #:     Referral Diagnosis: LBP   Referring provider: Rolando Franks CNP  Visit Diagnosis:     ICD-10-CM    1. Acute bilateral low back pain without sciatica  M54.5    2. Myofascial pain  M79.18          Assessment:     Patient is benefitting from skilled physical therapy and is making steady progress toward functional goals.  Patient is appropriate to continue with skilled physical therapy intervention, as indicated by initial plan of care.   She did have moderate glut activation bilaterally but did have poor firing patterns with the L glut. She has a great understanding of these exercises and how they relate to overall functional patterns.       Goal Status:  Pt. will demonstrate/verbalize independence in self-management of condition in : 6 weeks  Pt. will report decreased intensity, frequency of : Pain;in 6 weeks;Comment  Comment:: decrease pain from 0-3/10 to 0-1/10 with ADLs.  Patient will return to: exercise;for full duty;in 12 weeks;Comment  Comment: without an increase in pain    No data recorded    Plan / Patient Education:     Continue with initial plan of care.    Subjective:     Pain Ratin-6  The back does seem to be more sore. It is sort of up where it was first and the lower back is sore. She didn't do that much over the weekend and the back does notice that she wasn't active.    The rib/lower back pain may have gotten better. It seems to be 3-4 days a week and doesn't last all day. It is just on/off.     Objective:     LV, art fascial tensions.      Exercises:  Exercise #1: LTR with cross over  Comment #1: Single knee to chest/Double knee to chest  Exercise #2: Pelvic rocking and posture education  Comment #2: Diaphragmatic breathing  Exercise #3: HL TA  Comment #3: Prone glut retraining  Exercise #4: Clam  Comment #4: SL Abduction  Exercise #5: Glut med retraining into wall  Comment #5:  Pallof Press with blue TB  Exercise #6: Ed on previous HEP she is doing to maintain good pelvic stability       Treatment Today   2/22/2021   TREATMENT MINUTES COMMENTS   Evaluation     Self-care/ Home management     Manual therapy 10 Fascial release using Strain-Counterstrain of L LUM-LV, L EIL-A   Neuromuscular Re-education     Therapeutic Activity     Therapeutic Exercises 45 AA/PROM to BLE, TL spine, ribs, pelvis.  See flow sheet       Gait training     Modality__________________                Total 55    Blank areas are intentional and mean the treatment did not include these items.       Saul Fierro  2/22/2021

## 2021-06-15 NOTE — PROGRESS NOTES
[facility] Daily Progress     Patient Name: Nohelia Mcdaniels  Date: 3/1/2021  Visit #: 5  PTA visit #:     Referral Diagnosis: LBP   Referring provider: Rolando Franks CNP  Visit Diagnosis:     ICD-10-CM    1. Acute bilateral low back pain without sciatica  M54.5    2. Myofascial pain  M79.18          Assessment:     Patient is benefitting from skilled physical therapy and is making steady progress toward functional goals.  Patient is appropriate to continue with skilled physical therapy intervention, as indicated by initial plan of care.   She did have improvement in her sx post treatment today. Her main point of pain is related to visceral dysfunction and she was educated to look into her diet to see if that contributes to her pain. I am thinking this may be a causative factor.     Goal Status:  Pt. will demonstrate/verbalize independence in self-management of condition in : 6 weeks  Pt. will report decreased intensity, frequency of : Pain;in 6 weeks;Comment  Comment:: decrease pain from 0-3/10 to 0-1/10 with ADLs.  Patient will return to: exercise;for full duty;in 12 weeks;Comment  Comment: without an increase in pain    No data recorded    Plan / Patient Education:     Continue with initial plan of care.    Subjective:     Pain Ratin    Today the back hurts. It is down lower in the L side. She does just feel some tightness going up but not really pain.   She did take off yesterday for exercises and feels it more today. She did run 30 miles last week.   Exercises are going good for her. No questions on them at this time.       Objective:     MS, visc fascial tensions.      Treatment Today   3/1/2021   TREATMENT MINUTES COMMENTS   Evaluation     Self-care/ Home management     Manual therapy 25 Fascial release using Strain-Counterstrain of B lower abd-V, B cervical LF-MS  MFR: descending colon   Neuromuscular Re-education 15 Recip inhib of MS, visc fascia    Therapeutic Activity     Therapeutic  Exercises 15 AA/PROM to BLE, CTL spine, ribs, pelvis     Gait training     Modality__________________                Total 55    Blank areas are intentional and mean the treatment did not include these items.       Saul Fierro  3/1/2021

## 2021-06-15 NOTE — PROGRESS NOTES
S:  Patient presents for follow-up of back pain.  She was noted to have pain in her back and was seen by nurse practitioner Golden on 1/26/2021 and prescribed physical therapy.  She has been going to physical therapy conscientiously and has not had resolution.    The pain is located in the left mid back lateral aspect.  There is no radiation.  No numbness.  No weakness.  She has had no dysuria, hematuria or bowel changes.  Former smoker.    Medications: Metoprolol    O:   Blood pressure 118/80, pulse 55, respiration 12, weight 125  Alert conversant no acute distress  Examination of the back shows mild pain to palpation over the left lateral aspect of the back at approximately the L1 level.  A[place]ley no evidence of upper motor neuron weakness or deficit.  No midline tenderness.    A:   Back pain    P:   She continues to have back pain in spite of physical therapy for approximate 2 months.  Patient has a long smoking history and concern for malignancy must be considered.  CT scan of the abdomen pelvis to be obtained.    ADDENDUM:  Discussed CT mass with pt and referred to GYN.

## 2021-06-16 NOTE — H&P (VIEW-ONLY)
Preoperative Exam    Scheduled Procedure: Ovarian excision  Surgery Date: 4/12/2021  Surgery Location: [facility], fax [phone]    Surgeon: Dr. Santos    Very pleasant 60-year-old female with a past medical history of hypertension and hyperlipidemia as well as former smoker, presents today for preop evaluation for right ovarian excision on 4/12/2021.  The patient has had past surgeries including tonsillectomy with no complications.    Family history negative for malignant hyperthermia, she does not have dental appliances, she does not wear contact lenses, she does not have KARLA, she does not take blood thinners, and she is a Mallampati class III.    Assessment/Plan:     1. Pre-op exam  Patient has been optimized for surgery.  Advised her to discontinue fish oil.  - HM2(CBC w/o Differential)  - Basic Metabolic Panel    2. Essential hypertension  Patient will take metoprolol the evening before surgery.    Surgical Procedure Risk: Low (reported cardiac risk generally < 1%)    Pt has been optimized for surgery    Functional Status: Independent  Patient plans to recover at home with family.     Subjective:      Nohelia Mcdaniels is a 60 y.o. female who presents for a preoperative consultation.    ROS is negative except for hyperlipidemia and hypertension    Current Outpatient Medications   Medication Sig Dispense Refill     calcium carbonate (CALCIUM 600 ORAL) Take by mouth.       coenzyme Q10 (CO Q-10) 10 mg capsule Take 10 mg by mouth daily.       desonide (DESOWEN) 0.05 % cream Apply topically 2 (two) times a day. 15 g 0     fish oil-omega-3 fatty acids (FISH OIL) 300-1,000 mg capsule Take 2 g by mouth daily.       glucosamine HCl/chondroitin chandler (GLUCOSAMINE-CHONDROITIN ORAL) Take by mouth.       metoprolol succinate (TOPROL-XL) 50 MG 24 hr tablet Take 1 tablet (50 mg total) by mouth daily. 90 tablet 0     multivitamin with minerals (THERA-M) 9 mg iron-400 mcg Tab tablet Take 1 tablet by mouth daily.        No current facility-administered medications for this visit.         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     Acute Pharyngitis     Arthropathy Of Multiple Sites     Feeling Tired Or Poorly     Benign colonic polyp       Past Medical History:   Diagnosis Date     Benign colonic polyp 2015    had pre-cancerous polps removed       Past Surgical History:   Procedure Laterality Date     OK HEMORRHOIDECTOMY INTERNAL RUBBER BAND LIGATIONS      Description: Hemorrhoidectomy;  Proc Date: 2010;       Social History     Socioeconomic History     Marital status:      Spouse name: Not on file     Number of children: Not on file     Years of education: Not on file     Highest education level: Not on file   Occupational History     Not on file   Social Needs     Financial resource strain: Not on file     Food insecurity     Worry: Not on file     Inability: Not on file     Transportation needs     Medical: Not on file     Non-medical: Not on file   Tobacco Use     Smoking status: Former Smoker     Quit date: 2016     Years since quittin.9     Smokeless tobacco: Never Used     Tobacco comment: Pt started smoking again when her  Timmy . She has recently quit again.    Substance and Sexual Activity     Alcohol use: Not on file     Drug use: Not on file     Sexual activity: Not on file   Lifestyle     Physical activity     Days per week: Not on file     Minutes per session: Not on file     Stress: Not on file   Relationships     Social connections     Talks on phone: Not on file     Gets together: Not on file     Attends Confucianism service: Not on file     Active member of club or organization: Not on file     Attends meetings of clubs or organizations: Not on file     Relationship status:  "Not on file     Intimate partner violence     Fear of current or ex partner: Not on file     Emotionally abused: Not on file     Physically abused: Not on file     Forced sexual activity: Not on file   Other Topics Concern     Not on file   Social History Narrative     Not on file       Patient Care Team:  Davin Nolen MD as PCP - General (Family Medicine)  Davin Nolen MD as Assigned PCP          Objective:     Vitals:    04/07/21 1648   BP: 130/78   Pulse: 64   SpO2: 97%   Weight: 129 lb (58.5 kg)   Height: 5' 2\" (1.575 m)         Physical Exam:  /78   Pulse 64   Ht 5' 2\" (1.575 m)   Wt 129 lb (58.5 kg)   SpO2 97%   BMI 23.59 kg/m    There are no Patient Instructions on file for this visit.  Constitutional:    --Vitals as above  --No acute distress  Eyes-  --Sclera noninjected  --Lids and conjunctiva normal  ENT-  --TMs clear  --Sclera noninjected  --Pharynx not erythematous  Neck-  --Neck supple with no cervical lymphadenopathy  Lungs-  --Clear to Auscultation  Heart-  --Regular rate and rhythm  Abdomen--  --Soft, non-tender, non-distended  Skin-  --Pink and dry  Psych-  --Alert and oriented  --Normal affect        Labs:  Labs pending at this time.  Results will be reviewed when available.    Immunization History   Administered Date(s) Administered     DT (pediatric) 04/30/1999     INFLUENZA,RECOMBINANT,INJ,PF QUADRIVALENT 18+YRS 09/01/2020     Pneumo Polysac 23-V 01/01/1989     Td,adult,historic,unspecified 05/08/2009     Tdap 05/08/2009, 09/01/2020       Thank you for the opportunity to participate in the care for this patient.  If you have any concerns please do not hesitate to contact me at the [facility] at [phone].      Davin Nolen MD  [facility]    "

## 2021-06-16 NOTE — TELEPHONE ENCOUNTER
Reason for Call: Request for an order or referral:    Order or referral being requested: unable to get in with RACHID ramírez til 5.4.21 and on her report for the c.t. scan it states 21 x29 mm and then it also states 29 x 21 cm  Which is correct? And the appt they offered her was for a virtual visit only.     Date needed: as soon as possible    Has the patient been seen by the PCP for this problem? YES    Additional comments:     Phone number Patient can be reached at:    Cell number on file:    Telephone Information:   Mobile [phone]       Best Time:      Can we leave a detailed message on this number?  Yes    Call taken on 3/29/2021 at 2:42 PM by Liliya Melo

## 2021-06-16 NOTE — TELEPHONE ENCOUNTER
Pt presented to [facility] today asking for a pre-procedure, asymptomatic COVID swab for surgery on 4/12.   Unfortunately, pt forgot about her appt. At the  clinic     Staff called me asking if they could swab her. I said yes, as we would not want to ever have to cx surgery/OR time on such short notice.     I advised that I would place the order as STAT and will route message to her surgeon.    Edie Hamilton, FE Supervisor

## 2021-06-16 NOTE — ANESTHESIA POSTPROCEDURE EVALUATION
Patient: Nohelia Mcdaniels  Procedure(s):  LAPAROSCOPY RIGHT SALPINGO OOPHORECTOMY (Right)  Anesthesia type: general    Patient location: Phase II Recovery  Last vitals:   Vitals Value Taken Time   /81 04/12/21 1331   Temp 35.9  C (96.7  F) 04/12/21 1331   Pulse 70 04/12/21 1331   Resp 16 04/12/21 1331   SpO2 99 % 04/12/21 1331     Post vital signs: stable  Level of consciousness: awake and responds to simple questions  Post-anesthesia pain: pain controlled  Post-anesthesia nausea and vomiting: no  Pulmonary: unassisted, return to baseline  Cardiovascular: stable and blood pressure at baseline  Hydration: adequate  Anesthetic events: no    QCDR Measures:  ASA# 11 - Uzma-op Cardiac Arrest: ASA11B - Patient did NOT experience unanticipated cardiac arrest  ASA# 12 - Uzma-op Mortality Rate: ASA12B - Patient did NOT die  ASA# 13 - PACU Re-Intubation Rate: ASA13B - Patient did NOT require a new airway mgmt  ASA# 10 - Composite Anes Safety: ASA10A - No serious adverse event    Additional Notes:

## 2021-06-16 NOTE — ANESTHESIA PREPROCEDURE EVALUATION
Anesthesia Evaluation      Patient summary reviewed   No history of anesthetic complications     Airway   Mallampati: II  Neck ROM: full   Pulmonary - normal exam    breath sounds clear to auscultation  (+) a smoker  (-) sleep apnea                         Cardiovascular - normal exam  Exercise tolerance: > or = 4 METS  (+) hypertension, , hypercholesterolemia,     Rhythm: regular  Rate: normal,         Neuro/Psych    (+) seizures,     Endo/Other    (+) arthritis,   (-) no obesity, not pregnant     GI/Hepatic/Renal - negative ROS           Dental - normal exam                        Anesthesia Plan  Planned anesthetic: general endotracheal  Magnesium 4 grams  Ketamine  ASA 2   Induction: intravenous   Anesthetic plan and risks discussed with: patient  Anesthesia plan special considerations: antiemetics,   Post-op plan: routine recovery

## 2021-06-16 NOTE — OP NOTE
PROCEDURE NOTE - LAPAROSCOPY    NAME: Nohelia Mcdaniels   : 1960   MRN: [id number]      DATE OF SERVICE: 2021     PREOPERATIVE DIAGNOSIS:  Complex right ovarian cyst with normal CA-125    POSTOPERATIVE DIAGNOSIS:  Same    PROCEDURES: laparoscopy, Right salpingo-oophorectomy    SURGEON: Kervin Samson      ASSISTANT: Tierney Ruth    ANESTHESIA: general     ESTIMATED BLOOD LOSS:2 mL from 2021 12:29 PM to 2021  1:03 PM    HISTORY OF PRESENT ILLNESS:   Nohelia Mcdaniels is a 60 y.o. female with history of ovarian cyst.     CONSENT:  She was met preoperatively, where we discussed the procedure and the risks associated with the procedure. She understood these to be, but not limited to injury to adjacent organs including bladder, bowel, ureter, infection and bleeding. Patient signed consent and was brought back to the operating room in stable condition.     PROCEDURE:  Patient underwent induction of a general anesthetic, she was carefully prepped and draped in sterile dorsal lithotomy position for the procedure. Timeout was performed. Bladder was drained with a Recinos catheter.   A sterile speculum was placed.  The anterior lip of the cervix was grasped with a single tooth tenaculum.  A uterine manipulator was placed into the uterus.     Attention was turned to the abdomen. An incision below the umbilicus was made. A Veress needle was introduced with a 2 pop technique, saline drop test confirmed adequate placement. Opening pressure was 3-4. Insufflation was done until 15mm of pressure was established. A 5 nonbladed trocar was placed. Two more port sights were place in the right and left lower quadrants under direct visualization.  The left side was a 5 mm port in the right side was a 10 mm port.  Trandelenburg assistance was then used.     The procedure began with identifying the anatomy. The uterus was visualized was found to be normal appearing, tubes appeared normal, ovaries were  The ovary was normal.  The  right ovary had a 2 cm smooth walled cyst..  There were no adhesions in the pelvis.  There was no ascites or peritoneal studding.  There was a normal liver edge and gallbladder.    Using a LigaSure I took down the infundibulopelvic ligament on the right and detach the ovary and tube from the connection to the uterus.  Specimen was placed in an endoscopic bag and brought through the 10 mm port.  Hemostasis was seen at the pedicle site.    The trocars and gas were then removed from the abdomen.  The fascia on the 10 mm port was closed with 0 Vicryl.  Skin was closed with 4-0 Vicryl suture. Steri-Strips applied. Instruments were removed from vagina. No active bleeding was noted. Sponge and needle counts were correct X 2. She was taken to recovery in stable condition.     Kervin Samson MD        cc: Kervin Samson

## 2021-06-16 NOTE — PROGRESS NOTES
Preoperative Exam    Scheduled Procedure: Ovarian excision  Surgery Date: 4/12/2021  Surgery Location: [facility], fax [phone]    Surgeon: Dr. Santos    Very pleasant 60-year-old female with a past medical history of hypertension and hyperlipidemia as well as former smoker, presents today for preop evaluation for right ovarian excision on 4/12/2021.  The patient has had past surgeries including tonsillectomy with no complications.    Family history negative for malignant hyperthermia, she does not have dental appliances, she does not wear contact lenses, she does not have KARLA, she does not take blood thinners, and she is a Mallampati class III.    Assessment/Plan:     1. Pre-op exam  Patient has been optimized for surgery.  Advised her to discontinue fish oil.  - HM2(CBC w/o Differential)  - Basic Metabolic Panel    2. Essential hypertension  Patient will take metoprolol the evening before surgery.    Surgical Procedure Risk: Low (reported cardiac risk generally < 1%)    Pt has been optimized for surgery    Functional Status: Independent  Patient plans to recover at home with family.     Subjective:      Nohelia Mcdaniels is a 60 y.o. female who presents for a preoperative consultation.    ROS is negative except for hyperlipidemia and hypertension    Current Outpatient Medications   Medication Sig Dispense Refill     calcium carbonate (CALCIUM 600 ORAL) Take by mouth.       coenzyme Q10 (CO Q-10) 10 mg capsule Take 10 mg by mouth daily.       desonide (DESOWEN) 0.05 % cream Apply topically 2 (two) times a day. 15 g 0     fish oil-omega-3 fatty acids (FISH OIL) 300-1,000 mg capsule Take 2 g by mouth daily.       glucosamine HCl/chondroitin chandler (GLUCOSAMINE-CHONDROITIN ORAL) Take by mouth.       metoprolol succinate (TOPROL-XL) 50 MG 24 hr tablet Take 1 tablet (50 mg total) by mouth daily. 90 tablet 0     multivitamin with minerals (THERA-M) 9 mg iron-400 mcg Tab tablet Take 1 tablet by mouth daily.        No current facility-administered medications for this visit.         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     Acute Pharyngitis     Arthropathy Of Multiple Sites     Feeling Tired Or Poorly     Benign colonic polyp       Past Medical History:   Diagnosis Date     Benign colonic polyp 2015    had pre-cancerous polps removed       Past Surgical History:   Procedure Laterality Date     OR HEMORRHOIDECTOMY INTERNAL RUBBER BAND LIGATIONS      Description: Hemorrhoidectomy;  Proc Date: 2010;       Social History     Socioeconomic History     Marital status:      Spouse name: Not on file     Number of children: Not on file     Years of education: Not on file     Highest education level: Not on file   Occupational History     Not on file   Social Needs     Financial resource strain: Not on file     Food insecurity     Worry: Not on file     Inability: Not on file     Transportation needs     Medical: Not on file     Non-medical: Not on file   Tobacco Use     Smoking status: Former Smoker     Quit date: 2016     Years since quittin.9     Smokeless tobacco: Never Used     Tobacco comment: Pt started smoking again when her  Timmy . She has recently quit again.    Substance and Sexual Activity     Alcohol use: Not on file     Drug use: Not on file     Sexual activity: Not on file   Lifestyle     Physical activity     Days per week: Not on file     Minutes per session: Not on file     Stress: Not on file   Relationships     Social connections     Talks on phone: Not on file     Gets together: Not on file     Attends Congregation service: Not on file     Active member of club or organization: Not on file     Attends meetings of clubs or organizations: Not on file     Relationship status:  "Not on file     Intimate partner violence     Fear of current or ex partner: Not on file     Emotionally abused: Not on file     Physically abused: Not on file     Forced sexual activity: Not on file   Other Topics Concern     Not on file   Social History Narrative     Not on file       Patient Care Team:  Davin Nolen MD as PCP - General (Family Medicine)  Davin Nolen MD as Assigned PCP          Objective:     Vitals:    04/07/21 1648   BP: 130/78   Pulse: 64   SpO2: 97%   Weight: 129 lb (58.5 kg)   Height: 5' 2\" (1.575 m)         Physical Exam:  /78   Pulse 64   Ht 5' 2\" (1.575 m)   Wt 129 lb (58.5 kg)   SpO2 97%   BMI 23.59 kg/m    There are no Patient Instructions on file for this visit.  Constitutional:    --Vitals as above  --No acute distress  Eyes-  --Sclera noninjected  --Lids and conjunctiva normal  ENT-  --TMs clear  --Sclera noninjected  --Pharynx not erythematous  Neck-  --Neck supple with no cervical lymphadenopathy  Lungs-  --Clear to Auscultation  Heart-  --Regular rate and rhythm  Abdomen--  --Soft, non-tender, non-distended  Skin-  --Pink and dry  Psych-  --Alert and oriented  --Normal affect        Labs:  Labs pending at this time.  Results will be reviewed when available.    Immunization History   Administered Date(s) Administered     DT (pediatric) 04/30/1999     INFLUENZA,RECOMBINANT,INJ,PF QUADRIVALENT 18+YRS 09/01/2020     Pneumo Polysac 23-V 01/01/1989     Td,adult,historic,unspecified 05/08/2009     Tdap 05/08/2009, 09/01/2020       Thank you for the opportunity to participate in the care for this patient.  If you have any concerns please do not hesitate to contact me at the [facility] at [phone].      Davin Nolen MD  [facility]    "

## 2021-06-16 NOTE — ANESTHESIA CARE TRANSFER NOTE
Last vitals:   Vitals:    04/12/21 1312   BP: 147/72   Pulse: 76   Resp: 16   Temp: 36.1  C (97  F)   SpO2: 100%     Patient's level of consciousness is drowsy  Spontaneous respirations: yes  Maintains airway independently: yes  Dentition unchanged: yes  Oropharynx: oropharynx clear of all foreign objects    QCDR Measures:  ASA# 20 - Surgical Safety Checklist: WHO surgical safety checklist completed prior to induction    PQRS# 430 - Adult PONV Prevention: 4558F - Pt received => 2 anti-emetic agents (different classes) preop & intraop  ASA# 8 - Peds PONV Prevention: 4558F - Pt received => 2 anti-emetic agents (different classes) preop & intraop  PQRS# 424 - Uzma-op Temp Management: 4559F - At least one body temp DOCUMENTED => 35.5C or 95.9F within required timeframe  PQRS# 426 - PACU Transfer Protocol: - Transfer of care checklist used  ASA# 14 - Acute Post-op Pain: ASA14B - Patient did NOT experience pain >= 7 out of 10

## 2021-06-18 NOTE — PATIENT INSTRUCTIONS - HE
"Patient Instructions by Ciara Rodrigeuz PA-C at 7/23/2020  7:00 AM     Author: Ciara Rodriguez PA-C Service: -- Author Type: Physician Assistant    Filed: 7/23/2020  7:24 AM Encounter Date: 7/23/2020 Status: Addendum    : Ciara Rodriguez PA-C (Physician Assistant)    Related Notes: Original Note by Ciara Rodriguez PA-C (Physician Assistant) filed at 7/23/2020  7:23 AM       Patient Education   Follow up in 2 weeks for repeat urine test to make sure blood in urine has resolved. Return sooner if not improving.  Bladder Infection, Female (Adult)    Urine is normally doesn't have any bacteria in it. But bacteria can get into the urinary tract from the skin around the rectum. Or they can travel in the blood from elsewhere in the body. Once they are in your urinary tract, they can cause infection in the urethra (urethritis), the bladder (cystitis), or the kidneys (pyelonephritis).  The most common place for an infection is in the bladder. This is called a bladder infection. This is one of the most common infections in women. Most bladder infections are easily treated. They are not serious unless the infection spreads to the kidney.  The phrases \"bladder infection,\" \"UTI,\" and \"cystitis\" are often used to describe the same thing. But they are not always the same. Cystitis is an inflammation of the bladder. The most common cause of cystitis is an infection.  Symptoms  The infection causes inflammation in the urethra and bladder. This causes many of the symptoms. The most common symptoms of a bladder infection are:    Pain or burning when urinating    Having to urinate more often than usual    Urgent need to urinate    Only a small amount of urine comes out    Blood in urine    Abdominal discomfort. This is usually in the lower abdomen above the pubic bone.    Cloudy urine    Strong- or bad-smelling urine    Unable to urinate (urinary retention)    Unable to hold urine in (urinary " incontinence)    Fever    Loss of appetite    Confusion (in older adults)  Causes  Bladder infections are not contagious. You can't get one from someone else, from a toilet seat, or from sharing a bath.  The most common cause of bladder infections is bacteria from the bowels. The bacteria get onto the skin around the opening of the urethra. From there, they can get into the urine and travel up to the bladder, causing inflammation and infection. This usually happens because of:    Wiping improperly after urinating. Always wipe from front to back.    Bowel incontinence    Pregnancy    Procedures such as having a catheter inserted    Older age    Not emptying your bladder. This can allow bacteria a chance to grow in your urine.    Dehydration    Constipation    Sex    Use of a diaphragm for birth control   Treatment  Bladder infections are diagnosed by a urine test. They are treated with antibiotics and usually clear up quickly without complications. Treatment helps prevent a more serious kidney infection.  Medicines  Medicines can help in the treatment of a bladder infection:    Take antibiotics until they are used up, even if you feel better. It is important to finish them to make sure the infection has cleared.    You can use acetaminophen or ibuprofen for pain, fever, or discomfort, unless another medicine was prescribed. If you have chronic liver or kidney disease, talk with your healthcare provider before using these medicines. Also talk with your provider if you've ever had a stomach ulcer or gastrointestinal bleeding, or are taking blood-thinner medicines.    If you are given phenazopydridine to reduce burning with urination, it will cause your urine to become a bright orange color. This can stain clothing.  Care and prevention  These self-care steps can help prevent future infections:    Drink plenty of fluids to prevent dehydration and flush out your bladder. Do this unless you must restrict fluids for other  health reasons, or your doctor told you not to.    Proper cleaning after going to the bathroom is important. Wipe from front to back after using the toilet to prevent the spread of bacteria.    Urinate more often. Don't try to hold urine in for a long time.    Wear loose-fitting clothes and cotton underwear. Avoid tight-fitting pants.    Improve your diet and prevent constipation. Eat more fresh fruit and vegetables, and fiber, and less junk and fatty foods.    Avoid sex until your symptoms are gone.    Avoid caffeine, alcohol, and spicy foods. These can irritate your bladder.    Urinate right after intercourse to flush out your bladder.    If you use birth control pills and have frequent bladder infections, discuss it with your doctor.  Follow-up care  Call your healthcare provider if all symptoms are not gone after 3 days of treatment. This is especially important if you have repeat infections.  If a culture was done, you will be told if your treatment needs to be changed. If directed, you can call to find out the results.  If X-rays were done, you will be told if the results will affect your treatment.  Call 911  Call 911 if any of the following occur:    Trouble breathing    Hard to wake up or confusion    Fainting or loss of consciousness    Rapid heart rate  When to seek medical advice  Call your healthcare provider right away if any of these occur:    Fever of 100.4 F (38.0 C) or higher, or as directed by your healthcare provider    Symptoms are not better by the third day of treatment    Back or belly (abdominal) pain that gets worse    Repeated vomiting, or unable to keep medicine down    Weakness or dizziness    Vaginal discharge    Pain, redness, or swelling in the outer vaginal area (labia)  Date Last Reviewed: 10/1/2016    1576-9357 [employer]. [address] [place] [address]. All rights reserved. This information is not intended as a substitute for professional medical care.  Always follow your healthcare professional's instructions.

## 2021-06-20 NOTE — LETTER
Letter by Davin Nolen MD at      Author: Davin Nolen MD Service: -- Author Type: --    Filed:  Encounter Date: 9/2/2020 Status: (Other)         Nohelia Mcdaniels  [address] [facility] [place] [address]            September 2, 2020        Dear Ms. Mcdaniels,        Below are the results from your recent visit:    Resulted Orders   HM2(CBC w/o Differential)   Result Value Ref Range    WBC 10.0 4.0 - 11.0 thou/uL    RBC 4.68 3.80 - 5.40 mill/uL    Hemoglobin 14.4 12.0 - 16.0 g/dL    Hematocrit 43.0 35.0 - 47.0 %    MCV 92 80 - 100 fL    MCH 30.8 27.0 - 34.0 pg    MCHC 33.4 32.0 - 36.0 g/dL    RDW 12.9 11.0 - 14.5 %    Platelets 389 140 - 440 thou/uL    MPV 7.2 7.0 - 10.0 fL   Basic Metabolic Panel   Result Value Ref Range    Sodium 140 136 - 145 mmol/L    Potassium 4.4 3.5 - 5.0 mmol/L    Chloride 103 98 - 107 mmol/L    CO2 27 22 - 31 mmol/L    Anion Gap, Calculation 10 5 - 18 mmol/L    Glucose 81 70 - 125 mg/dL    Calcium 9.3 8.5 - 10.5 mg/dL    BUN 8 8 - 22 mg/dL    Creatinine 0.77 0.60 - 1.10 mg/dL    GFR MDRD Af Amer >60 >60 mL/min/1.73m2    GFR MDRD Non Af Amer >60 >60 mL/min/1.73m2    Narrative    Fasting Glucose reference range is 70-99 mg/dL per  American Diabetes Association (ADA) guidelines.   Lipid Cascade   Result Value Ref Range    Cholesterol 226 (H) <=199 mg/dL    Triglycerides 202 (H) <=149 mg/dL    HDL Cholesterol 54 >=50 mg/dL    LDL Calculated 132 (H) <=129 mg/dL    Patient Fasting > 8hrs? Yes          Nohelia, your labs look good.  Your cholesterol is slightly high but not worrisome.  Keep up the exercise and the smoking cessation and things will be fine.  We should visit in one year.    Sincerely,        OWEN Nolen MD, DESIREE  [facility]  [phone]

## 2021-06-20 NOTE — PROGRESS NOTES
Chief Complaint   Patient presents with     Medication Management     fasting       HPI: Very pleasant 58-year-old female presents today for annual exam.  She has had quite a bit of activity in her life as her 2 boys have both recently gotten  in the last several months.      Her Pap smear is up-to-date, she had a colonoscopy recently, and her mammogram is due and she is going to be getting it in the next month.  Referral placed.  She does not take calcium supplement, she has been losing weight, she has been exercising and otherwise feels well.    Her one concern is a 1 cm circular lesion on the lateral aspect of her right lower extremity has been there for many years.  It does not bleed and does not change colors but it is annoying and sometimes painful to her.    Her hypertension is been stable.  She would like to continue current medication.    ROS: No vaginal bleeding.  No respiratory difficulties.  No chest pain or shortness of breath    SH:    reports that she quit smoking about 2 years ago. She does not have any smokeless tobacco history on file.      FH: The Patient's family history includes Breast cancer (age of onset: 68) in her maternal aunt; Breast cancer (age of onset: 70) in her maternal aunt; Colon cancer (age of onset: 29) in her mother.     Meds:  Nohelia has a current medication list which includes the following prescription(s): albuterol and metoprolol succinate.    O:  /70  Pulse 61  Resp 16  Wt 131 lb 8 oz (59.6 kg)  SpO2 99%  BMI 23.29 kg/m2     Lungs--Clear to Auscultation  Heart--Regular rate and rhythm  Abdomen--Soft, non-tender, non-distended  Skin-Pink and dry     A/P:   1. Skin lesion  -The lesion is painful and I think it is reasonable to have this excised and examined by dermatology.  - Ambulatory referral to Dermatology    2. Wellness examination  -Encouraged calcium use  -Encouraged breast self-exam  -Encouraged mammography  -Discussed flu shot and she will  consider  - Basic Metabolic Panel  - HM2(CBC w/o Differential)  - Lipid Cascade    3. HTN (hypertension)  - metoprolol succinate (TOPROL XL) 50 MG 24 hr tablet; Take 1 tablet (50 mg total) by mouth daily.  Dispense: 90 tablet; Refill: 3  - Basic Metabolic Panel
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
## 2021-06-25 NOTE — PROGRESS NOTES
Chief Complaint   Patient presents with     Sinus Problem     Pt c/o coughing up dark brown phlegm, she is taking alk cold medicine x 3 days     Insomnia     Pt c/o not sleeping well since Timmy's passing.        HPI: Very pleasant 58-year-old female who presents today with cough and congestion coughing up brown sputum with substantial maxillary and frontal sinus pain.  This is in the context of her  passing away 3 months ago and still grieving.  She is getting decreased sleep due to grieving.    ROS: No fever vomiting diarrhea.  Positive rhinorrhea nasal congestion    SH:    reports that she quit smoking about 2 years ago. She does not have any smokeless tobacco history on file.      FH: The Patient's family history includes Breast cancer (age of onset: 68) in her maternal aunt; Breast cancer (age of onset: 70) in her maternal aunt; Colon cancer (age of onset: 29) in her mother.     Meds:  Nohelia has a current medication list which includes the following prescription(s): albuterol, temazepam, hydroxyzine pamoate, levofloxacin, and metoprolol succinate.    O:  /70   Pulse 76   Temp 98.3  F (36.8  C)   Resp 16   Wt 128 lb 3 oz (58.1 kg)   SpO2 97%   BMI 22.71 kg/m     Constitutional:    --Vitals as above  --No acute distress  Eyes-  --Sclera noninjected  --Lids and conjunctiva normal  ENT-  --TMs clear  --Sclera noninjected  --Pharynx substantially erythematous with PND  --Maxillary and frontal sinus pain bilaterally to palpation  Neck-  --Neck supple    Lymph-  --No cervical lymphadenopathy  Lungs-  --Clear to Auscultation  Heart-  --Regular rate and rhythm  Skin-  --Pink and dry          A/P:  1. Acute non-recurrent sinusitis, unspecified location  - levoFLOXacin (LEVAQUIN) 750 MG tablet; Take 1 tablet (750 mg total) by mouth daily for 14 days.  Dispense: 14 tablet; Refill: 0    2. Insomnia, unspecified type  - hydrOXYzine pamoate (VISTARIL) 25 MG capsule; Take 1 capsule (25 mg total) by mouth at  bedtime as needed for itching.  Dispense: 30 capsule; Refill: 0

## 2021-07-03 NOTE — ADDENDUM NOTE
Addendum Note by Davin Nolen MD at 1/15/2020  4:41 PM     Author: Davin Nolen MD Service: -- Author Type: Physician    Filed: 1/15/2020  4:41 PM Encounter Date: 1/15/2020 Status: Signed    : Davin Nolen MD (Physician)    Addended by: DAVIN NOLEN on: 1/15/2020 04:41 PM        Modules accepted: Orders

## 2021-07-03 NOTE — ADDENDUM NOTE
Addendum Note by Davin Nolen MD at 9/25/2019  8:45 AM     Author: Davin Nolen MD Service: -- Author Type: Physician    Filed: 9/25/2019  1:17 PM Encounter Date: 9/25/2019 Status: Signed    : Davin Nolen MD (Physician)    Addended by: DAVIN NOLEN on: 9/25/2019 01:17 PM        Modules accepted: Orders

## 2021-07-04 NOTE — PROGRESS NOTES
Progress Notes by Saul Fierro at 3/1/2021  8:00 AM     Author: Saul Fierro Service: -- Author Type: Physical Therapist    Filed: 7/2/2021  3:31 PM Encounter Date: 3/1/2021 Status: Signed    : Saul Fierro (Physical Therapist)       [facility] Discharge Summary  Patient Name: Nohelia Mcdaniels  Date: 7/2/2021  Referral Diagnosis: LBP  Referring provider: Rolando Franks CNP  Visit Diagnosis:   1. Acute bilateral low back pain without sciatica     2. Myofascial pain           Patient was seen for 5 visits from 2/1/21 to 3/1/21 with 0 missed appointments.  The patient attended therapy initially, but did not finish the therapy sessions prescribed.  Goals were not fully achieved. Explanation for goals not achieved: did not make further appts.     Therapy will be discontinued at this time.  The patient will need a new referral to resume.    Thank you for your referral.  Saul Fierro  7/2/2021  3:31 PM

## 2021-07-13 ENCOUNTER — RECORDS - HEALTHEAST (OUTPATIENT)
Dept: ADMINISTRATIVE | Facility: CLINIC | Age: 61
End: 2021-07-13

## 2021-07-21 ENCOUNTER — RECORDS - HEALTHEAST (OUTPATIENT)
Dept: ADMINISTRATIVE | Facility: CLINIC | Age: 61
End: 2021-07-21

## 2021-07-22 ENCOUNTER — RECORDS - HEALTHEAST (OUTPATIENT)
Dept: FAMILY MEDICINE | Facility: CLINIC | Age: 61
End: 2021-07-22

## 2021-07-22 DIAGNOSIS — Z12.31 OTHER SCREENING MAMMOGRAM: ICD-10-CM

## 2021-08-02 ENCOUNTER — PATIENT OUTREACH (OUTPATIENT)
Dept: PLASTIC SURGERY | Facility: CLINIC | Age: 61
End: 2021-08-02

## 2021-08-02 NOTE — PATIENT INSTRUCTIONS
Returned call to pt regarding scheduling different type of surgery. Left message with direct contact information and requested call back to discuss. Kenzie DAVIS RN, BSN

## 2021-08-03 DIAGNOSIS — H02.834 DERMATOCHALASIS OF BOTH UPPER EYELIDS: Primary | ICD-10-CM

## 2021-08-03 DIAGNOSIS — H02.831 DERMATOCHALASIS OF BOTH UPPER EYELIDS: Primary | ICD-10-CM

## 2021-08-03 RX ORDER — CEFAZOLIN SODIUM 2 G/50ML
2 SOLUTION INTRAVENOUS SEE ADMIN INSTRUCTIONS
Status: CANCELLED | OUTPATIENT
Start: 2021-08-03

## 2021-08-03 RX ORDER — CEFAZOLIN SODIUM 2 G/50ML
2 SOLUTION INTRAVENOUS
Status: CANCELLED | OUTPATIENT
Start: 2021-08-03

## 2021-08-25 ENCOUNTER — HOSPITAL ENCOUNTER (OUTPATIENT)
Facility: AMBULATORY SURGERY CENTER | Age: 61
End: 2021-08-25
Attending: PLASTIC SURGERY
Payer: COMMERCIAL

## 2021-08-25 PROBLEM — H02.831 DERMATOCHALASIS OF BOTH UPPER EYELIDS: Status: ACTIVE | Noted: 2020-11-04

## 2021-08-25 PROBLEM — H02.834 DERMATOCHALASIS OF BOTH UPPER EYELIDS: Status: ACTIVE | Noted: 2020-11-04

## 2021-10-02 DIAGNOSIS — Z11.59 ENCOUNTER FOR SCREENING FOR OTHER VIRAL DISEASES: ICD-10-CM

## 2021-10-11 ENCOUNTER — HEALTH MAINTENANCE LETTER (OUTPATIENT)
Age: 61
End: 2021-10-11

## 2021-10-13 ENCOUNTER — OFFICE VISIT (OUTPATIENT)
Dept: PLASTIC SURGERY | Facility: CLINIC | Age: 61
End: 2021-10-13
Payer: COMMERCIAL

## 2021-10-13 VITALS
WEIGHT: 123.6 LBS | OXYGEN SATURATION: 100 % | HEIGHT: 61 IN | DIASTOLIC BLOOD PRESSURE: 89 MMHG | HEART RATE: 80 BPM | BODY MASS INDEX: 23.33 KG/M2 | SYSTOLIC BLOOD PRESSURE: 136 MMHG

## 2021-10-13 DIAGNOSIS — H02.834 DERMATOCHALASIS OF UPPER AND LOWER EYELIDS OF BOTH EYES: Primary | ICD-10-CM

## 2021-10-13 DIAGNOSIS — H02.832 DERMATOCHALASIS OF UPPER AND LOWER EYELIDS OF BOTH EYES: Primary | ICD-10-CM

## 2021-10-13 DIAGNOSIS — H02.835 DERMATOCHALASIS OF UPPER AND LOWER EYELIDS OF BOTH EYES: Primary | ICD-10-CM

## 2021-10-13 DIAGNOSIS — H02.831 DERMATOCHALASIS OF UPPER AND LOWER EYELIDS OF BOTH EYES: Primary | ICD-10-CM

## 2021-10-13 PROCEDURE — 99213 OFFICE O/P EST LOW 20 MIN: CPT | Performed by: PLASTIC SURGERY

## 2021-10-13 ASSESSMENT — PAIN SCALES - GENERAL: PAINLEVEL: NO PAIN (0)

## 2021-10-13 ASSESSMENT — MIFFLIN-ST. JEOR: SCORE: 1073.03

## 2021-10-13 NOTE — NURSING NOTE
"Chief Complaint   Patient presents with     Consult     Pre-op consult -- DOS 10/28       Vitals:    10/13/21 0824   BP: 136/89   Pulse: 80   SpO2: 100%   Weight: 56.1 kg (123 lb 9.6 oz)   Height: 1.549 m (5' 1\")       Body mass index is 23.35 kg/m .          ALINA BUNCH EMT    "

## 2021-10-13 NOTE — PROGRESS NOTES
PREOPERATIVE VISIT NOTE    PRESENTING COMPLAINT:  Preoperative visit for upcoming bilateral upper lid blepharoplasty scheduled for the end of this month.    HISTORY OF PRESENTING COMPLAINT:  Ms. Mcdaniels is 61 years old, here to discuss a preoperative visit.  I found out that she is smoking a pack a day.  She had restarted it, even though at our last visit she had quit.  Otherwise, no change in her history and physical exam.    ASSESSMENT AND PLAN:  Based upon the above findings, a diagnosis of preoperative visit for upcoming bilateral upper lid blepharoplasty was made.  I discussed with the patient that a pack-a-day smoking currently is not conducive to proceeding with any elective facial surgery.  In my opinion, she needs to be off all nicotine products for at least 4-6 weeks and then proceed.  We will postpone her surgery for another month if she quits today.  Otherwise, she needs to reschedule it according to when she quits.  This was discussed with her.  She understood.  I will see her back appropriately.    Total time spent with chart review, the visit itself and post visit paperwork was 20 minutes.

## 2021-10-13 NOTE — LETTER
10/13/2021       RE: Nohelia Mcdaniels  [address] [address]     Dear Colleague,    Thank you for referring your patient, Nohelia Mcdaniels, to the [facility] [place] at [facility]. Please see a copy of my visit note below.    PREOPERATIVE VISIT NOTE    PRESENTING COMPLAINT:  Preoperative visit for upcoming bilateral upper lid blepharoplasty scheduled for the end of this month.    HISTORY OF PRESENTING COMPLAINT:  Ms. Mcdaniels is 61 years old, here to discuss a preoperative visit.  I found out that she is smoking a pack a day.  She had restarted it, even though at our last visit she had quit.  Otherwise, no change in her history and physical exam.    ASSESSMENT AND PLAN:  Based upon the above findings, a diagnosis of preoperative visit for upcoming bilateral upper lid blepharoplasty was made.  I discussed with the patient that a pack-a-day smoking currently is not conducive to proceeding with any elective facial surgery.  In my opinion, she needs to be off all nicotine products for at least 4-6 weeks and then proceed.  We will postpone her surgery for another month if she quits today.  Otherwise, she needs to reschedule it according to when she quits.  This was discussed with her.  She understood.  I will see her back appropriately.    Total time spent with chart review, the visit itself and post visit paperwork was 20 minutes.        Again, thank you for allowing me to participate in the care of your patient.      Sincerely,    AMINA Hough MD

## 2021-12-23 ENCOUNTER — OFFICE VISIT (OUTPATIENT)
Dept: FAMILY MEDICINE | Facility: CLINIC | Age: 61
End: 2021-12-23
Payer: COMMERCIAL

## 2021-12-23 VITALS
BODY MASS INDEX: 23.03 KG/M2 | HEART RATE: 84 BPM | DIASTOLIC BLOOD PRESSURE: 80 MMHG | SYSTOLIC BLOOD PRESSURE: 118 MMHG | WEIGHT: 122 LBS | OXYGEN SATURATION: 98 % | HEIGHT: 61 IN

## 2021-12-23 DIAGNOSIS — D48.5 NEOPLASM OF UNCERTAIN BEHAVIOR OF SKIN: ICD-10-CM

## 2021-12-23 DIAGNOSIS — Z23 NEEDS FLU SHOT: ICD-10-CM

## 2021-12-23 DIAGNOSIS — Z23 NEED FOR COVID-19 VACCINE: ICD-10-CM

## 2021-12-23 DIAGNOSIS — Z72.0 TOBACCO ABUSE: ICD-10-CM

## 2021-12-23 DIAGNOSIS — R19.00 ABDOMINAL WALL BULGE: Primary | ICD-10-CM

## 2021-12-23 PROCEDURE — 91300 COVID-19,PF,PFIZER (12+ YRS): CPT | Performed by: NURSE PRACTITIONER

## 2021-12-23 PROCEDURE — 90471 IMMUNIZATION ADMIN: CPT | Performed by: NURSE PRACTITIONER

## 2021-12-23 PROCEDURE — 99214 OFFICE O/P EST MOD 30 MIN: CPT | Mod: 25 | Performed by: NURSE PRACTITIONER

## 2021-12-23 PROCEDURE — 90682 RIV4 VACC RECOMBINANT DNA IM: CPT | Performed by: NURSE PRACTITIONER

## 2021-12-23 PROCEDURE — 0004A COVID-19,PF,PFIZER (12+ YRS): CPT | Performed by: NURSE PRACTITIONER

## 2021-12-23 ASSESSMENT — MIFFLIN-ST. JEOR: SCORE: 1065.77

## 2021-12-23 NOTE — PROGRESS NOTES
"Chief Complaint   Patient presents with     Mass     possible abdominal hernia        HPI: Patient presents today with concerns of a lump just over her umbilicus.  Popped up on its own for the past week.  Feels mildly uncomfortable like a bruise when she lays on it.  Feels like a hard knot.  No nausea or vomiting.  Bowel movements are soft and regular.  She has unfortunately started smoking again and is ready to quit.  She has also developed a lesion at the base of her thumb.  She tried over-the-counter wart drops which have not been helpful.  She has a history of lipomas.    ROS:Review of Systems - negative except for what's listed in the HPI    SH: The Patient's  reports that she has been smoking. She has never used smokeless tobacco. She reports current alcohol use. She reports previous drug use.      FH: The Patient's family history includes Breast Cancer (age of onset: 68.00) in her maternal aunt; Breast Cancer (age of onset: 70.00) in her maternal aunt; Colon Cancer (age of onset: 29.00) in her mother.     Meds:    Current Outpatient Medications   Medication     calcium carbonate (CALCIUM 600 ORAL)     coenzyme Q10 (CO Q-10) 10 mg capsule     fish oil-omega-3 fatty acids (FISH OIL) 300-1,000 mg capsule     glucosamine HCl/chondroitin chandler (GLUCOSAMINE-CHONDROITIN ORAL)     ibuprofen (ADVIL,MOTRIN) 600 MG tablet     metoprolol succinate (TOPROL-XL) 50 MG 24 hr tablet     multivitamin with minerals (THERA-M) 9 mg iron-400 mcg Tab tablet     nicotine (NICORETTE) 4 MG lozenge     desonide (DESOWEN) 0.05 % cream     oxyCODONE (ROXICODONE) 5 MG immediate release tablet     No current facility-administered medications for this visit.       O:  /80   Pulse 84   Ht 1.549 m (5' 1\")   Wt 55.3 kg (122 lb)   SpO2 98%   Breastfeeding No   BMI 23.05 kg/m      Physical Examination:   General appearance - alert, well appearing, and in no distress  Mental status - alert, oriented to person, place, and time  Neck - no " significant adenopathy  Lymphatics - no palpable lymphadenopathy  Chest - clear to auscultation, no wheezes, rales or rhonchi, symmetric air entry  Heart - normal rate and regular rhythm, S1 and S2 normal, no murmurs noted  Abdomen -above the umbilicus there is a firm lump measuring approximately 2 cm in diameter.  Not reducible.  No surrounding erythema.  Otherwise normal.  Extremities - peripheral pulses normal, no peripheral edema  Skin -at the base of the thumb there is a small lesion measuring approximately 1 mm in diameter.  Darker pigmentation.  No erythema.  No erosion.      A/P:       ICD-10-CM    1. Need for COVID-19 vaccine  Z23 COVID-19,PF,PFIZER (12+ YRS)   2. Abdominal wall bulge  R19.00 US Abdomen Complete     CANCELED: US Hernia Evaluation   3. Needs flu shot  Z23    4. Tobacco abuse  Z72.0 nicotine (NICORETTE) 4 MG lozenge   5. Neoplasm of uncertain behavior of skin  D48.5      Flu shot and COVID vaccine given.  Firm lump is noted on the abdomen.  Patient did recently have ovarian cyst removed, but this does not appear to be in the correct position.  It also does not feel as reducible this is what I would anticipate hernia to be.  Potential for lipoma.  Discussed different avenues for addressing and she chooses ultrasound.  Nicotine lozenges sent to the pharmacy.  Offered cryotherapy for the spot on the hand but for now she elects to just monitor.    Reviewed outside CT scan of the abdomen x1, surgical note x1, pathology x1    Abdominal wall bulge  - US Abdomen Complete    Need for COVID-19 vaccine  - COVID-19,PF,PFIZER (12+ YRS)    Needs flu shot    Tobacco abuse  - nicotine (NICORETTE) 4 MG lozenge  Dispense: 108 lozenge; Refill: 0    Neoplasm of uncertain behavior of skin        Rolando Franks, CNP      This note has been dictated using voice recognition software. Any grammatical or context distortions are unintentional and inherent to the software.

## 2021-12-23 NOTE — PATIENT INSTRUCTIONS
Ultrasound ordered.    Radiology scheduling should be contacting you, but their number is [phone] if you don't hear anything.    Nicotine lozenges sent off as well.     Flu and covid shots given today.

## 2021-12-30 ENCOUNTER — HOSPITAL ENCOUNTER (OUTPATIENT)
Dept: ULTRASOUND IMAGING | Facility: CLINIC | Age: 61
Discharge: HOME | End: 2021-12-30
Attending: NURSE PRACTITIONER | Admitting: NURSE PRACTITIONER
Payer: COMMERCIAL

## 2021-12-30 DIAGNOSIS — R19.00 ABDOMINAL WALL BULGE: ICD-10-CM

## 2021-12-30 PROCEDURE — 76705 ECHO EXAM OF ABDOMEN: CPT

## 2022-01-03 ENCOUNTER — MYC MEDICAL ADVICE (OUTPATIENT)
Dept: FAMILY MEDICINE | Facility: CLINIC | Age: 62
End: 2022-01-03
Payer: COMMERCIAL

## 2022-01-24 DIAGNOSIS — I10 HTN (HYPERTENSION): ICD-10-CM

## 2022-01-26 RX ORDER — METOPROLOL SUCCINATE 50 MG/1
50 TABLET, EXTENDED RELEASE ORAL DAILY
Qty: 90 TABLET | Refills: 1 | Status: SHIPPED | OUTPATIENT
Start: 2022-01-26 | End: 2022-05-06

## 2022-01-26 NOTE — TELEPHONE ENCOUNTER
"Routing refill request to provider for review/approval because:  A break in medication    Last Written Prescription Date:  1/26/21  Last Fill Quantity: 90,  # refills: 0   Last office visit provider:  12/23/21     Requested Prescriptions   Pending Prescriptions Disp Refills     metoprolol succinate ER (TOPROL-XL) 50 MG 24 hr tablet 90 tablet 0     Sig: Take 1 tablet (50 mg) by mouth daily       Beta-Blockers Protocol Passed - 1/24/2022  2:38 PM        Passed - Blood pressure under 140/90 in past 12 months     BP Readings from Last 3 Encounters:   12/23/21 118/80   10/13/21 136/89   04/07/21 130/78                 Passed - Patient is age 6 or older        Passed - Recent (12 mo) or future (30 days) visit within the authorizing provider's specialty     Patient has had an office visit with the authorizing provider or a provider within the authorizing providers department within the previous 12 mos or has a future within next 30 days. See \"Patient Info\" tab in inbasket, or \"Choose Columns\" in Meds & Orders section of the refill encounter.              Passed - Medication is active on med list             Rodrigue Powell RN 01/26/22 9:52 AM  "

## 2022-05-03 DIAGNOSIS — I10 HTN (HYPERTENSION): ICD-10-CM

## 2022-05-03 NOTE — TELEPHONE ENCOUNTER
Reason for Call:  Medication or medication refill:    Do you use a [facility]?  Name of the pharmacy and phone number for the current request:  [facility]    Name of the medication requested: METROPROLOL    Other request: CHANGE IN PHARMACY TO [facility], WILL BE FOR ALL MEDICATIONS    Can we leave a detailed message on this number? YES    Phone number patient can be reached at: Home number on file [phone] (home)    Best Time: ANYTIME    Call taken on 5/3/2022 at 10:49 AM by Luiz Rivera

## 2022-05-06 RX ORDER — METOPROLOL SUCCINATE 50 MG/1
50 TABLET, EXTENDED RELEASE ORAL DAILY
Qty: 90 TABLET | Refills: 1 | Status: SHIPPED | OUTPATIENT
Start: 2022-05-06 | End: 2022-11-12

## 2022-05-06 NOTE — TELEPHONE ENCOUNTER
"Routing refill request to provider for review/approval because:  Early refill request    Last Written Prescription Date:  1/26/2022  Last Fill Quantity: 90,  # refills: 1   Last office visit provider:  12/23/2021       Requested Prescriptions   Pending Prescriptions Disp Refills     metoprolol succinate ER (TOPROL XL) 50 MG 24 hr tablet 90 tablet 1     Sig: Take 1 tablet (50 mg) by mouth daily       Beta-Blockers Protocol Passed - 5/6/2022  2:05 PM        Passed - Blood pressure under 140/90 in past 12 months     BP Readings from Last 3 Encounters:   12/23/21 118/80   10/13/21 136/89   04/07/21 130/78                 Passed - Patient is age 6 or older        Passed - Recent (12 mo) or future (30 days) visit within the authorizing provider's specialty     Patient has had an office visit with the authorizing provider or a provider within the authorizing providers department within the previous 12 mos or has a future within next 30 days. See \"Patient Info\" tab in inbasket, or \"Choose Columns\" in Meds & Orders section of the refill encounter.              Passed - Medication is active on med list             Augusta Quiros RN 05/06/22 2:06 PM  "

## 2022-09-24 ENCOUNTER — HEALTH MAINTENANCE LETTER (OUTPATIENT)
Age: 62
End: 2022-09-24

## 2022-09-29 ENCOUNTER — OFFICE VISIT (OUTPATIENT)
Dept: FAMILY MEDICINE | Facility: CLINIC | Age: 62
End: 2022-09-29
Payer: COMMERCIAL

## 2022-09-29 VITALS
TEMPERATURE: 98.3 F | HEART RATE: 123 BPM | SYSTOLIC BLOOD PRESSURE: 124 MMHG | BODY MASS INDEX: 22.33 KG/M2 | WEIGHT: 118.2 LBS | OXYGEN SATURATION: 95 % | DIASTOLIC BLOOD PRESSURE: 82 MMHG

## 2022-09-29 DIAGNOSIS — Z11.59 ENCOUNTER FOR HEPATITIS C SCREENING TEST FOR LOW RISK PATIENT: ICD-10-CM

## 2022-09-29 DIAGNOSIS — Z23 IMMUNIZATION DUE: ICD-10-CM

## 2022-09-29 DIAGNOSIS — Z72.0 TOBACCO ABUSE DISORDER: ICD-10-CM

## 2022-09-29 DIAGNOSIS — J20.9 ACUTE BRONCHITIS, UNSPECIFIED ORGANISM: Primary | ICD-10-CM

## 2022-09-29 DIAGNOSIS — R06.02 SOB (SHORTNESS OF BREATH) ON EXERTION: ICD-10-CM

## 2022-09-29 DIAGNOSIS — R09.1 PLEURITIS: ICD-10-CM

## 2022-09-29 DIAGNOSIS — Z12.31 VISIT FOR SCREENING MAMMOGRAM: ICD-10-CM

## 2022-09-29 LAB
BASOPHILS # BLD AUTO: 0.1 10E3/UL (ref 0–0.2)
BASOPHILS NFR BLD AUTO: 0 %
EOSINOPHIL # BLD AUTO: 0 10E3/UL (ref 0–0.7)
EOSINOPHIL NFR BLD AUTO: 0 %
ERYTHROCYTE [DISTWIDTH] IN BLOOD BY AUTOMATED COUNT: 13.5 % (ref 10–15)
HCT VFR BLD AUTO: 44.3 % (ref 35–47)
HGB BLD-MCNC: 14.8 G/DL (ref 11.7–15.7)
IMM GRANULOCYTES # BLD: 0 10E3/UL
IMM GRANULOCYTES NFR BLD: 0 %
LYMPHOCYTES # BLD AUTO: 3 10E3/UL (ref 0.8–5.3)
LYMPHOCYTES NFR BLD AUTO: 14 %
MCH RBC QN AUTO: 30.9 PG (ref 26.5–33)
MCHC RBC AUTO-ENTMCNC: 33.4 G/DL (ref 31.5–36.5)
MCV RBC AUTO: 93 FL (ref 78–100)
MONOCYTES # BLD AUTO: 1.7 10E3/UL (ref 0–1.3)
MONOCYTES NFR BLD AUTO: 8 %
NEUTROPHILS # BLD AUTO: 17.3 10E3/UL (ref 1.6–8.3)
NEUTROPHILS NFR BLD AUTO: 78 %
PLATELET # BLD AUTO: 369 10E3/UL (ref 150–450)
RBC # BLD AUTO: 4.79 10E6/UL (ref 3.8–5.2)
WBC # BLD AUTO: 22.1 10E3/UL (ref 4–11)

## 2022-09-29 PROCEDURE — 85025 COMPLETE CBC W/AUTO DIFF WBC: CPT | Performed by: FAMILY MEDICINE

## 2022-09-29 PROCEDURE — 86803 HEPATITIS C AB TEST: CPT | Performed by: FAMILY MEDICINE

## 2022-09-29 PROCEDURE — 36415 COLL VENOUS BLD VENIPUNCTURE: CPT | Performed by: FAMILY MEDICINE

## 2022-09-29 PROCEDURE — 99214 OFFICE O/P EST MOD 30 MIN: CPT | Performed by: FAMILY MEDICINE

## 2022-09-29 RX ORDER — PREDNISONE 20 MG/1
20 TABLET ORAL DAILY
Qty: 5 TABLET | Refills: 0 | Status: SHIPPED | OUTPATIENT
Start: 2022-09-29 | End: 2022-10-04

## 2022-09-29 RX ORDER — AZITHROMYCIN 250 MG/1
TABLET, FILM COATED ORAL
Qty: 6 TABLET | Refills: 0 | Status: SHIPPED | OUTPATIENT
Start: 2022-09-29 | End: 2022-10-04

## 2022-09-29 ASSESSMENT — PAIN SCALES - GENERAL: PAINLEVEL: SEVERE PAIN (7)

## 2022-09-29 NOTE — LETTER
AMINA [facility]  [address], [address] [facility] [id number] [phone]  Dept: [phone]      9/29/2022    Re: Nohelia Mcdaniels      TO WHOM IT MAY CONCERN:    Nohelia Mcdaniels  was seen on 9/29/22.  Please excuse her  until 10/3/22 due to illness.    Cordjerome Daily MD  [facility]

## 2022-09-29 NOTE — PROGRESS NOTES
Acute bronchitis, unspecified organism  Versus pneumonia.  I will give her a Z-Tommy, check blood counts and do an x-ray.  - azithromycin (ZITHROMAX) 250 MG tablet  Dispense: 6 tablet; Refill: 0  - XR Chest 2 Views  - CBC with platelets and differential  - CBC with platelets and differential    Tobacco abuse disorder  Longtime smoker who is continuing to smoke and we discussed she needs to quit.    Pleuritis  She is having pain on the right side possibly in the rib cage/muscles versus real lung pleuritis.  I think she needs prednisone anyhow.  - predniSONE (DELTASONE) 20 MG tablet  Dispense: 5 tablet; Refill: 0    SOB (shortness of breath) on exertion  Possibly pneumonia.    Encounter for hepatitis C screening test for low risk patient  - Hepatitis C Screen Reflex to HCV RNA Quant and Genotype  - Hepatitis C Screen Reflex to HCV RNA Quant and Genotype    Visit for screening mammogram  Behind on her preventative measures.  - MA SCREENING DIGITAL BILAT - Future  (s+30)    Immunization due  I will put orders in for a flu and new COVID-vaccine for when she is feeling better.  - INFLUENZA QUAD, RECOMBINANT, P-FREE (RIV4) (FLUBLOK) AGE 50-64 [[id number]]  - COVID-19,PF,PFIZER BOOSTER BIVALENT    I will get back to her on her lab results and x-ray when they are back.  She needs a note for work which was given to the patient.  She needs to come back in follow-up if she is not better or if there are exaggerated findings.  She should also come back for vaccines when she is  feeling better.      Kate Pozo is a 62 year old, presenting for the following health issues:  Nasal Congestion (09/21/2022 nasal drainage, stuffy, SOB, productive cough-green, under R breast is painful to move/lay down. Negative covid test 09/22/2022. Pt denies fever, sore throat. )      History of Present Illness       Reason for visit:  Chest cold and soreness. Possible ploursee  Symptoms include:  Severe mucus. Headache. Cough  Symptom intensity:   Moderate  Symptom progression:  Worsening  Had these symptoms before:  Yes  Has tried/received treatment for these symptoms:  Yes  Previous treatment was successful:  Yes  Prior treatment description:  Antibiotic. Cough syrup  What makes it worse:  Coughing  What makes it better:  Sleep. Hot tea    She eats 2-3 servings of fruits and vegetables daily.She consumes 0 sweetened beverage(s) daily.She exercises with enough effort to increase her heart rate 10 to 19 minutes per day.  She exercises with enough effort to increase her heart rate 4 days per week.   She is taking medications regularly.       Acute Illness  Acute illness concerns: Upper respiratory infection with shortness of breath on exertion and worsening cough  Onset/Duration: Started with Head Cold 1 Week Ago and acutely worse cough as of yesterday  Symptoms:  Fever: No  Chills/Sweats: YES  Headache (location?): YES  Sinus Pressure: YES  Conjunctivitis:  No  Ear Pain: no  Rhinorrhea: YES  Congestion: YES  Sore Throat: No  Cough: YES-productive of yellow sputum, with shortness of breath, worsening over time  Wheeze: YES  Decreased Appetite: YES  Nausea: No  Vomiting: No  Diarrhea: No  Dysuria/Freq.: No  Dysuria or Hematuria: No  Fatigue/Achiness: YES  Sick/Strep Exposure: No  Therapies tried and outcome: Over-the-counter and other conservative treatments.        Objective    /82   Pulse (!) 123   Temp 98.3  F (36.8  C) (Oral)   Wt 53.6 kg (118 lb 3.2 oz)   LMP  (LMP Unknown)   SpO2 95%   Breastfeeding No   BMI 22.33 kg/m    Body mass index is 22.33 kg/m .  Physical Exam   GENERAL: Moderately ill appearing middle-aged female with frequent productive cough, appearing fatigued  RESP: expiratory wheezes throughout, inspiratory wheezes throughout and bronchial breath sounds   CV: regular rates and rhythm and no murmur, click or rub  ABDOMEN: soft, nontender

## 2022-09-30 LAB — HCV AB SERPL QL IA: NONREACTIVE

## 2022-10-04 ENCOUNTER — OFFICE VISIT (OUTPATIENT)
Dept: FAMILY MEDICINE | Facility: CLINIC | Age: 62
End: 2022-10-04
Payer: COMMERCIAL

## 2022-10-04 VITALS
OXYGEN SATURATION: 97 % | TEMPERATURE: 97.9 F | DIASTOLIC BLOOD PRESSURE: 80 MMHG | BODY MASS INDEX: 22.11 KG/M2 | SYSTOLIC BLOOD PRESSURE: 132 MMHG | WEIGHT: 117 LBS | HEART RATE: 79 BPM

## 2022-10-04 DIAGNOSIS — R05.1 ACUTE COUGH: ICD-10-CM

## 2022-10-04 DIAGNOSIS — J18.9 PNEUMONIA OF RIGHT MIDDLE LOBE DUE TO INFECTIOUS ORGANISM: Primary | ICD-10-CM

## 2022-10-04 DIAGNOSIS — Z87.891 RECENTLY QUIT USING TOBACCO: ICD-10-CM

## 2022-10-04 PROCEDURE — 99213 OFFICE O/P EST LOW 20 MIN: CPT | Performed by: FAMILY MEDICINE

## 2022-10-04 RX ORDER — CODEINE PHOSPHATE/GUAIFENESIN 10-100MG/5
5 LIQUID (ML) ORAL
Qty: 180 ML | Refills: 0 | Status: SHIPPED | OUTPATIENT
Start: 2022-10-04 | End: 2022-11-22

## 2022-10-04 ASSESSMENT — PAIN SCALES - GENERAL: PAINLEVEL: NO PAIN (0)

## 2022-10-04 ASSESSMENT — ENCOUNTER SYMPTOMS: COUGH: 1

## 2022-10-04 NOTE — PROGRESS NOTES
Pneumonia of right middle lobe due to infectious organism  We discussed that when somebody has pneumonia it takes much longer to get over it and that it is not surprising at this point that she is still symptomatic.  We discussed that a chest x-ray could be checked but should not be checked earlier than about 6 weeks out.  We do discussed that she could get a recheck of her lab at this point but it is not necessary as her clinical picture is slowly improving.    Acute cough  She is having cough symptoms and uses cough syrup during the day, but she is having a hard time sleeping at night so I suggested trying some cough syrup with codeine before bedtime.  - guaiFENesin-codeine (GUAIFENESIN AC) 100-10 MG/5ML syrup  Dispense: 180 mL; Refill: 0    Recently quit using tobacco  I applauded her on not smoking and suggested she take this time to continue her momentum.  We discussed meds and options for her to help with smoking cessation, but she tells me she did not tolerate Chantix as she had horrible nightmares.  She would prefer to try to quit on her own.  I told her if she changed her mind she could reach out to us for help.    She does have a future appointment on 10/19 for immunizations with nursing as I wrote for flu and new COVID-vaccine orders.  And I believe she can follow-up concerning the pneumonia and for a med check in another month or so..  She is to be seen sooner if she should take a turn for the worse.    Kate Pozo is a 62 year old, presenting for the following health issues:  Cough (Follow up ; continues to have cough, sob, fatigue)      Cough    The patient was seen on 9/29/2022 when she was acutely ill with a cough and some mild shortness of breath.  She appeared quite ill at the time and so I did a chest x-ray, labs, and gave her 5-day course of azithromycin and prednisone.  She reports that the prednisone helped but made her a bit crazy.  Chest x-ray came back as having a right infrahilar  opacity that ran into the right middle lung.  She reports she has improved with her cough although it continues as does her fatigue and shortness of breath on exertion.  However, they are all moving in the correct direction at this point.  She also reports that she has not smoked since she last saw me.  I congratulated her on this.      Review of Systems   Respiratory: Positive for cough.           Objective    /80   Pulse 79   Temp 97.9  F (36.6  C) (Oral)   Wt 53.1 kg (117 lb)   LMP  (LMP Unknown)   SpO2 97%   BMI 22.11 kg/m    Body mass index is 22.11 kg/m .  Physical Exam

## 2022-10-04 NOTE — LETTER
AMINA [facility]  [address] [facility], [address] [facility] [address]  [phone]  Dept: [phone]      10/4/2022    Re: Nohelia Mcdaniels      TO WHOM IT MAY CONCERN:    Nohelia Mcdaniels  was seen on 10/4/22.  Please excuse her  until 10/9/22 due to illness. She may return to work on 10/10/22.     MD AMINA [facility]

## 2022-10-19 ENCOUNTER — ALLIED HEALTH/NURSE VISIT (OUTPATIENT)
Dept: FAMILY MEDICINE | Facility: CLINIC | Age: 62
End: 2022-10-19
Payer: COMMERCIAL

## 2022-10-19 DIAGNOSIS — Z23 IMMUNIZATION DUE: Primary | ICD-10-CM

## 2022-10-19 PROCEDURE — 0124A COVID-19,PF,PFIZER BOOSTER BIVALENT: CPT

## 2022-10-19 PROCEDURE — 99207 PR NO CHARGE NURSE ONLY: CPT

## 2022-10-19 PROCEDURE — 90682 RIV4 VACC RECOMBINANT DNA IM: CPT

## 2022-10-19 PROCEDURE — 90471 IMMUNIZATION ADMIN: CPT

## 2022-10-19 PROCEDURE — 91312 COVID-19,PF,PFIZER BOOSTER BIVALENT: CPT

## 2022-11-03 ENCOUNTER — TELEPHONE (OUTPATIENT)
Dept: FAMILY MEDICINE | Facility: CLINIC | Age: 62
End: 2022-11-03

## 2022-11-03 NOTE — TELEPHONE ENCOUNTER
Order/Referral Request    Who is requesting: Patient     Orders being requested: Chest X-Ray.     Reason service is needed/diagnosis: Pneumonia. Patient states she was suppose to have a follow up chest xray.     When are orders needed by: When possible     Has this been discussed with Provider: Yes    Does patient have a preference on a Group/Provider/Facility? [facility]    Does patient have an appointment scheduled?: No    Where to send orders: N/A    Could we send this information to you in [facility] or would you prefer to receive a phone call?:   Patient would prefer a phone call   Okay to leave a detailed message?: Yes at Cell number on file:    Telephone Information:   Mobile [phone]

## 2022-11-09 ENCOUNTER — HOSPITAL ENCOUNTER (OUTPATIENT)
Dept: MAMMOGRAPHY | Facility: CLINIC | Age: 62
Discharge: HOME | End: 2022-11-09
Attending: FAMILY MEDICINE | Admitting: FAMILY MEDICINE
Payer: COMMERCIAL

## 2022-11-09 DIAGNOSIS — Z12.31 VISIT FOR SCREENING MAMMOGRAM: ICD-10-CM

## 2022-11-09 PROCEDURE — 77067 SCR MAMMO BI INCL CAD: CPT

## 2022-11-10 DIAGNOSIS — I10 HTN (HYPERTENSION): ICD-10-CM

## 2022-11-10 NOTE — TELEPHONE ENCOUNTER
Refill Request  Medication name: Pending Prescriptions:                       Disp   Refills    metoprolol succinate ER (TOPROL XL) 50 MG*90 tab*1            Sig: Take 1 tablet (50 mg) by mouth daily    [facility]ed [facility]: [facility]

## 2022-11-12 RX ORDER — METOPROLOL SUCCINATE 50 MG/1
50 TABLET, EXTENDED RELEASE ORAL DAILY
Qty: 90 TABLET | Refills: 3 | Status: SHIPPED | OUTPATIENT
Start: 2022-11-12 | End: 2023-02-02

## 2022-11-12 NOTE — TELEPHONE ENCOUNTER
"Last Written Prescription Date:  5/6/22  Last Fill Quantity: 90,  # refills: 1   Last office visit provider:  10/4/22     Requested Prescriptions   Pending Prescriptions Disp Refills     metoprolol succinate ER (TOPROL XL) 50 MG 24 hr tablet 90 tablet 1     Sig: Take 1 tablet (50 mg) by mouth daily       Beta-Blockers Protocol Passed - 11/10/2022 11:07 AM        Passed - Blood pressure under 140/90 in past 12 months     BP Readings from Last 3 Encounters:   10/04/22 132/80   09/29/22 124/82   12/23/21 118/80                 Passed - Patient is age 6 or older        Passed - Recent (12 mo) or future (30 days) visit within the authorizing provider's specialty     Patient has had an office visit with the authorizing provider or a provider within the authorizing providers department within the previous 12 mos or has a future within next 30 days. See \"Patient Info\" tab in inbasket, or \"Choose Columns\" in Meds & Orders section of the refill encounter.              Passed - Medication is active on med list             Susanne Harden RN 11/12/22 1:23 PM  "

## 2022-11-17 NOTE — PROGRESS NOTES
Chief Complaint   Patient presents with     Shoulder Pain     Pt c/o bilateral shoulder pain(sometimes throbbing that wakes her at night, worse in the morning) that goes into biceps and triceps, she has iced which does help x one month       HPI: Patient presents with bilateral shoulder pain for 3 weeks duration of moderate intensity.  She noted that 3 weeks ago she swept out the garage vigorously and since that time has had bilateral pain in both shoulders and upper arms.  She denies any trauma.  There is been no swelling.  She has had no fever.    ROS: No rashes, no distal neurological dysfunction, no swelling.  No locking clicking or popping.    SH:    reports that she quit smoking about 2 years ago. She does not have any smokeless tobacco history on file.      FH: The Patient's family history includes Breast cancer (age of onset: 68) in her maternal aunt; Breast cancer (age of onset: 70) in her maternal aunt; Colon cancer (age of onset: 29) in her mother.     Meds:  Nohelia has a current medication list which includes the following prescription(s): albuterol, hydroxyzine pamoate, temazepam, methylprednisolone, and metoprolol succinate.    O:  /82   Pulse 82   Resp 16   Wt 125 lb 8 oz (56.9 kg)   SpO2 99%   BMI 22.23 kg/m    Patient is alert conversant no acute distress  Examination of both shoulders show both shoulders are symmetric, normal range of motion, normal distal neurological vascular function.  Normal flexion and extension bilaterally.  She can easily put both hands behind her head.  Pain to palpation of both the muscles of the lateral rotator cuff and triceps bilaterally.    A/P:   1. Acute pain of both shoulders  This most likely represents a mild myalgia given the type of pain, the rapid onset and the fact is bilateral.  I encouraged her not to lift her grandchildren, to reduce activity, and will start her on a short course of Medrol Dosepak.  If not improving would consider imaging.  -  methylPREDNISolone (MEDROL, SANDOVAL,) 4 mg tablet; Follow package directions  Dispense: 21 tablet; Refill: 0                                           Yes...

## 2022-11-22 ENCOUNTER — ANCILLARY PROCEDURE (OUTPATIENT)
Dept: GENERAL RADIOLOGY | Facility: CLINIC | Age: 62
End: 2022-11-22
Attending: NURSE PRACTITIONER
Payer: COMMERCIAL

## 2022-11-22 ENCOUNTER — OFFICE VISIT (OUTPATIENT)
Dept: FAMILY MEDICINE | Facility: CLINIC | Age: 62
End: 2022-11-22
Payer: COMMERCIAL

## 2022-11-22 VITALS
OXYGEN SATURATION: 98 % | TEMPERATURE: 98.2 F | WEIGHT: 121.5 LBS | HEART RATE: 109 BPM | SYSTOLIC BLOOD PRESSURE: 120 MMHG | BODY MASS INDEX: 22.96 KG/M2 | RESPIRATION RATE: 16 BRPM | DIASTOLIC BLOOD PRESSURE: 78 MMHG

## 2022-11-22 DIAGNOSIS — E78.2 MIXED HYPERLIPIDEMIA: ICD-10-CM

## 2022-11-22 DIAGNOSIS — R06.09 DOE (DYSPNEA ON EXERTION): ICD-10-CM

## 2022-11-22 DIAGNOSIS — J18.9 PNEUMONIA DUE TO INFECTIOUS ORGANISM, UNSPECIFIED LATERALITY, UNSPECIFIED PART OF LUNG: ICD-10-CM

## 2022-11-22 DIAGNOSIS — J18.9 PNEUMONIA DUE TO INFECTIOUS ORGANISM, UNSPECIFIED LATERALITY, UNSPECIFIED PART OF LUNG: Primary | ICD-10-CM

## 2022-11-22 LAB
ATRIAL RATE - MUSE: 88 BPM
DIASTOLIC BLOOD PRESSURE - MUSE: NORMAL MMHG
ERYTHROCYTE [DISTWIDTH] IN BLOOD BY AUTOMATED COUNT: 14.1 % (ref 10–15)
HCT VFR BLD AUTO: 39.7 % (ref 35–47)
HGB BLD-MCNC: 13.2 G/DL (ref 11.7–15.7)
INTERPRETATION ECG - MUSE: NORMAL
MCH RBC QN AUTO: 31 PG (ref 26.5–33)
MCHC RBC AUTO-ENTMCNC: 33.2 G/DL (ref 31.5–36.5)
MCV RBC AUTO: 93 FL (ref 78–100)
P AXIS - MUSE: 58 DEGREES
PLATELET # BLD AUTO: 328 10E3/UL (ref 150–450)
PR INTERVAL - MUSE: 144 MS
QRS DURATION - MUSE: 82 MS
QT - MUSE: 342 MS
QTC - MUSE: 413 MS
R AXIS - MUSE: 29 DEGREES
RBC # BLD AUTO: 4.26 10E6/UL (ref 3.8–5.2)
SYSTOLIC BLOOD PRESSURE - MUSE: NORMAL MMHG
T AXIS - MUSE: 46 DEGREES
VENTRICULAR RATE- MUSE: 88 BPM
WBC # BLD AUTO: 11 10E3/UL (ref 4–11)

## 2022-11-22 PROCEDURE — 99214 OFFICE O/P EST MOD 30 MIN: CPT | Performed by: NURSE PRACTITIONER

## 2022-11-22 PROCEDURE — 93005 ELECTROCARDIOGRAM TRACING: CPT | Performed by: NURSE PRACTITIONER

## 2022-11-22 PROCEDURE — 36415 COLL VENOUS BLD VENIPUNCTURE: CPT | Performed by: NURSE PRACTITIONER

## 2022-11-22 PROCEDURE — 80061 LIPID PANEL: CPT | Performed by: NURSE PRACTITIONER

## 2022-11-22 PROCEDURE — 85027 COMPLETE CBC AUTOMATED: CPT | Performed by: NURSE PRACTITIONER

## 2022-11-22 PROCEDURE — 71046 X-RAY EXAM CHEST 2 VIEWS: CPT | Mod: TC | Performed by: RADIOLOGY

## 2022-11-22 PROCEDURE — 93010 ELECTROCARDIOGRAM REPORT: CPT | Performed by: GENERAL ACUTE CARE HOSPITAL

## 2022-11-22 ASSESSMENT — PAIN SCALES - GENERAL: PAINLEVEL: NO PAIN (0)

## 2022-11-22 NOTE — PATIENT INSTRUCTIONS
EKG is reassuring.    Rechecking chest x-ray along with a little bit of blood work.  We will reach out to you once we get these results back.

## 2022-11-22 NOTE — PROGRESS NOTES
Assessment & Plan     ICD-10-CM    1. Pneumonia due to infectious organism, unspecified laterality, unspecified part of lung  J18.9 XR Chest 2 Views     CBC with platelets     CBC with platelets      2. Mixed hyperlipidemia  E78.2 Lipid panel     Lipid panel      3. GRIFFIN (dyspnea on exertion)  R06.09 EKG 12-lead, tracing only        Recheck cbc to assess persistent fatigue/GRIFFIN. No further leukocytosis. ECG reassuring. Update Xray to confirm resolution. If worsening, ED. Recheck lipids and determine statin recommendations based on ASCVD score.    Reviewed family medicine note x1, xray chest x1, cbc x1.    Subjective     HPI     GRIFFIN  SOB going up a flight of stairs. History of smoking.  No swelling in the calves. No history of PE.  No hemoptysis.   Wells score for PE-1.5=low risk.      Pneumonia  Needs repeat chest xray. Still has a mild residual cough leftover.  Daughter had a respiratory illness a few weeks ago now. Still smoke free since September.      Review of Systems - negative except for what's listed in the HPI      Objective    /78 (BP Location: Right arm, Patient Position: Sitting, Cuff Size: Adult Regular)   Pulse 109   Temp 98.2  F (36.8  C) (Oral)   Resp 16   Wt 55.1 kg (121 lb 8 oz)   LMP  (LMP Unknown)   SpO2 98%   BMI 22.96 kg/m    Physical Exam   General appearance - alert, well appearing, and in no distress  Mental status - alert, oriented to person, place, and time  Eyes -PERRLA  Ears - bilateral TM's and external ear canals normal  Nose - normal and patent, no erythema, discharge or polyps  Mouth - mucous membranes moist. No oral lesions.  Neck - no significant adenopathy  Lymphatics - no palpable lymphadenopathy  Chest - clear to auscultation, no wheezes, rales or rhonchi, symmetric air entry  Heart - normal rate and regular rhythm, S1 and S2 normal, no murmurs noted  Neurological - grossly intact.  Extremities - peripheral pulses normal, no peripheral edema  Skin - normal coloration  richard de leon.    Rolando Franks, CNP    This note has been dictated using voice recognition software. Any grammatical or context distortions are unintentional and inherent to the software.

## 2022-11-23 LAB
CHOLEST SERPL-MCNC: 225 MG/DL
HDLC SERPL-MCNC: 74 MG/DL
LDLC SERPL CALC-MCNC: 121 MG/DL
NONHDLC SERPL-MCNC: 151 MG/DL
TRIGL SERPL-MCNC: 150 MG/DL

## 2023-01-29 ENCOUNTER — HEALTH MAINTENANCE LETTER (OUTPATIENT)
Age: 63
End: 2023-01-29

## 2023-02-02 DIAGNOSIS — I10 HTN (HYPERTENSION): ICD-10-CM

## 2023-02-02 RX ORDER — METOPROLOL SUCCINATE 50 MG/1
50 TABLET, EXTENDED RELEASE ORAL DAILY
Qty: 90 TABLET | Refills: 2 | Status: SHIPPED | OUTPATIENT
Start: 2023-02-02 | End: 2023-09-07

## 2023-02-02 NOTE — TELEPHONE ENCOUNTER
Refill Request  Medication name: Pending Prescriptions:                       Disp   Refills    metoprolol succinate ER (TOPROL XL) 50 MG*90 tab*3            Sig: Take 1 tablet (50 mg) by mouth daily    [facility]ed [facility]acy: [facility]     *change in pharmacy*

## 2023-02-03 NOTE — TELEPHONE ENCOUNTER
"Last Written Prescription Date:  11/12/22 (pharmacy change requested)  Last Fill Quantity: 90,  # refills: 3  Last office visit provider:  11/22/22    Requested Prescriptions   Pending Prescriptions Disp Refills     metoprolol succinate ER (TOPROL XL) 50 MG 24 hr tablet 90 tablet 3     Sig: Take 1 tablet (50 mg) by mouth daily       Beta-Blockers Protocol Passed - 2/2/2023  8:23 AM        Passed - Blood pressure under 140/90 in past 12 months     BP Readings from Last 3 Encounters:   11/22/22 120/78   10/04/22 132/80   09/29/22 124/82                 Passed - Patient is age 6 or older        Passed - Recent (12 mo) or future (30 days) visit within the authorizing provider's specialty     Patient has had an office visit with the authorizing provider or a provider within the authorizing providers department within the previous 12 mos or has a future within next 30 days. See \"Patient Info\" tab in inbasket, or \"Choose Columns\" in Meds & Orders section of the refill encounter.              Passed - Medication is active on med list             Yen Flores RN 02/02/23 9:06 PM  "

## 2023-09-07 DIAGNOSIS — I10 HTN (HYPERTENSION): ICD-10-CM

## 2023-09-07 RX ORDER — METOPROLOL SUCCINATE 50 MG/1
50 TABLET, EXTENDED RELEASE ORAL DAILY
Qty: 90 TABLET | Refills: 0 | Status: SHIPPED | OUTPATIENT
Start: 2023-09-07

## 2023-09-08 NOTE — TELEPHONE ENCOUNTER
"Last Written Prescription Date:  2/2/2023  Last Fill Quantity: 90,  # refills: 2   Last office visit provider:  11/22/2022 with PCP ANDRÉS Franks NP      Requested Prescriptions   Pending Prescriptions Disp Refills    metoprolol succinate ER (TOPROL XL) 50 MG 24 hr tablet [Pharmacy Med Name: Metoprolol Succinate ER 50 MG Oral Tablet Extended Release 24 Hour] 90 tablet 3     Sig: TAKE 1 TABLET BY MOUTH DAILY       Beta-Blockers Protocol Passed - 9/7/2023  9:35 PM        Passed - Blood pressure under 140/90 in past 12 months     BP Readings from Last 3 Encounters:   11/22/22 120/78   10/04/22 132/80   09/29/22 124/82                 Passed - Patient is age 6 or older        Passed - Recent (12 mo) or future (30 days) visit within the authorizing provider's specialty     Patient has had an office visit with the authorizing provider or a provider within the authorizing providers department within the previous 12 mos or has a future within next 30 days. See \"Patient Info\" tab in inbasket, or \"Choose Columns\" in Meds & Orders section of the refill encounter.              Passed - Medication is active on med list             Renee Serrato RN 09/07/23 11:10 PM  "

## 2023-12-01 DIAGNOSIS — I10 HTN (HYPERTENSION): ICD-10-CM

## 2023-12-04 RX ORDER — METOPROLOL SUCCINATE 50 MG/1
50 TABLET, EXTENDED RELEASE ORAL DAILY
Qty: 90 TABLET | Refills: 3 | OUTPATIENT
Start: 2023-12-04

## 2024-02-25 ENCOUNTER — HEALTH MAINTENANCE LETTER (OUTPATIENT)
Age: 64
End: 2024-02-25

## 2025-01-12 ENCOUNTER — HEALTH MAINTENANCE LETTER (OUTPATIENT)
Age: 65
End: 2025-01-12

## 2025-03-15 ENCOUNTER — HEALTH MAINTENANCE LETTER (OUTPATIENT)
Age: 65
End: 2025-03-15

## 2025-05-05 NOTE — TELEPHONE ENCOUNTER
Spoke to pt and provided Cosmetic Estimates. Pt will contact us if interested in proceeding.   ALBERT